# Patient Record
Sex: FEMALE | Race: BLACK OR AFRICAN AMERICAN | NOT HISPANIC OR LATINO
[De-identification: names, ages, dates, MRNs, and addresses within clinical notes are randomized per-mention and may not be internally consistent; named-entity substitution may affect disease eponyms.]

---

## 2017-07-21 PROBLEM — Z00.00 ENCOUNTER FOR PREVENTIVE HEALTH EXAMINATION: Status: ACTIVE | Noted: 2017-07-21

## 2017-07-26 ENCOUNTER — APPOINTMENT (OUTPATIENT)
Dept: OBGYN | Facility: CLINIC | Age: 27
End: 2017-07-26

## 2017-07-26 ENCOUNTER — OUTPATIENT (OUTPATIENT)
Dept: OUTPATIENT SERVICES | Facility: HOSPITAL | Age: 27
LOS: 1 days | Discharge: HOME | End: 2017-07-26

## 2017-07-31 DIAGNOSIS — Z02.9 ENCOUNTER FOR ADMINISTRATIVE EXAMINATIONS, UNSPECIFIED: ICD-10-CM

## 2017-10-31 ENCOUNTER — APPOINTMENT (OUTPATIENT)
Dept: SURGERY | Facility: CLINIC | Age: 27
End: 2017-10-31
Payer: COMMERCIAL

## 2017-10-31 VITALS
DIASTOLIC BLOOD PRESSURE: 70 MMHG | BODY MASS INDEX: 28.53 KG/M2 | HEIGHT: 63 IN | SYSTOLIC BLOOD PRESSURE: 122 MMHG | WEIGHT: 161 LBS

## 2017-10-31 DIAGNOSIS — K60.2 ANAL FISSURE, UNSPECIFIED: ICD-10-CM

## 2017-10-31 DIAGNOSIS — Z82.49 FAMILY HISTORY OF ISCHEMIC HEART DISEASE AND OTHER DISEASES OF THE CIRCULATORY SYSTEM: ICD-10-CM

## 2017-10-31 DIAGNOSIS — Z82.3 FAMILY HISTORY OF STROKE: ICD-10-CM

## 2017-10-31 PROCEDURE — 99203 OFFICE O/P NEW LOW 30 MIN: CPT | Mod: 25

## 2017-10-31 PROCEDURE — 46600 DIAGNOSTIC ANOSCOPY SPX: CPT

## 2017-10-31 RX ORDER — HYDROCORTISONE 25 MG/G
2.5 CREAM TOPICAL 3 TIMES DAILY
Qty: 60 | Refills: 0 | Status: ACTIVE | COMMUNITY
Start: 2017-10-31 | End: 1900-01-01

## 2017-11-01 PROBLEM — Z82.3 FAMILY HISTORY OF CEREBROVASCULAR ACCIDENT (CVA): Status: ACTIVE | Noted: 2017-10-31

## 2017-11-01 PROBLEM — Z82.49 FAMILY HISTORY OF HYPERTENSION: Status: ACTIVE | Noted: 2017-10-31

## 2017-11-01 RX ORDER — PRENATAL VIT 75/IRON/FOLIC/OM3 28-800-223
28-0.8 & 223 COMBINATION PACKAGE (EA) ORAL
Refills: 0 | Status: ACTIVE | COMMUNITY

## 2017-11-01 RX ORDER — OMEGA-3/DHA/EPA/FISH OIL 35-113.5MG
1000 TABLET,CHEWABLE ORAL
Refills: 0 | Status: ACTIVE | COMMUNITY

## 2017-11-28 ENCOUNTER — APPOINTMENT (OUTPATIENT)
Dept: SURGERY | Facility: CLINIC | Age: 27
End: 2017-11-28

## 2017-12-09 ENCOUNTER — OUTPATIENT (OUTPATIENT)
Dept: OUTPATIENT SERVICES | Facility: HOSPITAL | Age: 27
LOS: 1 days | Discharge: HOME | End: 2017-12-09

## 2017-12-09 DIAGNOSIS — O26.892 OTHER SPECIFIED PREGNANCY RELATED CONDITIONS, SECOND TRIMESTER: ICD-10-CM
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
## 2018-02-04 ENCOUNTER — OUTPATIENT (OUTPATIENT)
Dept: OUTPATIENT SERVICES | Facility: HOSPITAL | Age: 28
LOS: 1 days | Discharge: HOME | End: 2018-02-04

## 2018-02-04 VITALS — SYSTOLIC BLOOD PRESSURE: 123 MMHG | TEMPERATURE: 98 F | DIASTOLIC BLOOD PRESSURE: 75 MMHG

## 2018-02-04 DIAGNOSIS — Z98.890 OTHER SPECIFIED POSTPROCEDURAL STATES: Chronic | ICD-10-CM

## 2018-02-04 LAB
APTT BLD: 23.5 SEC — CRITICAL LOW (ref 27–39.2)
FIBRINOGEN PPP-MCNC: 606 MG/DL — HIGH (ref 204.4–570.6)
HCT VFR BLD CALC: 30.5 % — LOW (ref 37–47)
HGB BLD-MCNC: 10.1 G/DL — LOW (ref 14–18)
INR BLD: 0.94 RATIO — SIGNIFICANT CHANGE UP (ref 0.65–1.3)
MCHC RBC-ENTMCNC: 30.2 PG — SIGNIFICANT CHANGE UP (ref 27–31)
MCHC RBC-ENTMCNC: 33.1 G/DL — SIGNIFICANT CHANGE UP (ref 32–37)
MCV RBC AUTO: 91.3 FL — HIGH (ref 81–91)
NRBC # BLD: 0 /100 WBCS — SIGNIFICANT CHANGE UP (ref 0–0)
PLATELET # BLD AUTO: 254 K/UL — SIGNIFICANT CHANGE UP (ref 130–400)
PROTHROM AB SERPL-ACNC: 10.1 SEC — SIGNIFICANT CHANGE UP (ref 9.95–12.87)
RBC # BLD: 3.34 M/UL — LOW (ref 4.2–5.4)
RBC # FLD: 12.6 % — SIGNIFICANT CHANGE UP (ref 11.5–14.5)
WBC # BLD: 13.42 K/UL — HIGH (ref 4.8–10.8)
WBC # FLD AUTO: 13.42 K/UL — HIGH (ref 4.8–10.8)

## 2018-02-04 RX ORDER — SODIUM CHLORIDE 9 MG/ML
1000 INJECTION, SOLUTION INTRAVENOUS
Qty: 0 | Refills: 0 | Status: DISCONTINUED | OUTPATIENT
Start: 2018-02-04 | End: 2018-02-19

## 2018-02-04 RX ORDER — ACETAMINOPHEN 500 MG
650 TABLET ORAL ONCE
Qty: 0 | Refills: 0 | Status: COMPLETED | OUTPATIENT
Start: 2018-02-04 | End: 2018-02-04

## 2018-02-04 RX ADMIN — Medication 650 MILLIGRAM(S): at 19:49

## 2018-02-04 RX ADMIN — SODIUM CHLORIDE 125 MILLILITER(S): 9 INJECTION, SOLUTION INTRAVENOUS at 19:49

## 2018-02-04 RX ADMIN — Medication 650 MILLIGRAM(S): at 18:37

## 2018-02-04 NOTE — OB PROVIDER TRIAGE NOTE - NSOBPROVIDERNOTE_OBGYN_ALL_OB_FT
28 yo  at 35.5wk, s/p fall at 1245 r/o placental abruption and  labor  - Transfer to RR  - IV fluids  - Abruption labs  - Clear liquids  - Tylenol for pain    Dr. Carmona and Dr. Díaz aware

## 2018-02-04 NOTE — OB PROVIDER TRIAGE NOTE - NSHPPHYSICALEXAM_GEN_ALL_CORE
T(C): 36.7 (02-04-18 @ 16:51), Max: 36.7 (02-04-18 @ 16:51)  HR: 100 (02-04-18 @ 17:05) (100 - 100)  BP: 123/75 (02-04-18 @ 17:05) (123/75 - 123/75)  RR: 20 (02-04-18 @ 17:05) (20 - 20)  SpO2: --  Udip: neg  EFM: 140/mod/+accels  [place]: Q2-3m  SVE: C/L/P  ABD: Gravid, soft, NT, no palpable ctx, +lower back tenderness, no CVA or suprapubic tenderness

## 2018-02-04 NOTE — OB PROVIDER TRIAGE NOTE - HISTORY OF PRESENT ILLNESS
28 yo  at 35w5d by first trim sonogram, here for back pain after someone fell on her at work at 1245. 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 Him and her did not fall to the ground. His head grazed her right cheek. She denies any abdominal trauma. Afterwards, her back started to hurt, has gotten worse, constant worse with movement. Did not take any pain meds at work. Finished her shift and came to the hospital. Is able to walk but it hurts her back. Denies ctx, LOF, VB. Good FM since the incident. Denies fever, chills, CP, SOB, Abd pain, dysuria. Reports not eating or drinking much today. Denies complications during this pregnancy.

## 2018-02-05 VITALS — SYSTOLIC BLOOD PRESSURE: 105 MMHG | DIASTOLIC BLOOD PRESSURE: 63 MMHG | HEART RATE: 93 BPM

## 2018-02-05 LAB
APTT BLD: 28.5 SEC — SIGNIFICANT CHANGE UP (ref 27–39.2)
BLD GP AB SCN SERPL QL: SIGNIFICANT CHANGE UP
HCT VFR BLD CALC: 28.2 % — LOW (ref 37–47)
HGB BLD-MCNC: 9.4 G/DL — LOW (ref 14–18)
INR BLD: 0.92 RATIO — SIGNIFICANT CHANGE UP (ref 0.65–1.3)
MCHC RBC-ENTMCNC: 30.4 PG — SIGNIFICANT CHANGE UP (ref 27–31)
MCHC RBC-ENTMCNC: 33.3 G/DL — SIGNIFICANT CHANGE UP (ref 32–37)
MCV RBC AUTO: 91.3 FL — HIGH (ref 81–91)
NRBC # BLD: 0 /100 WBCS — SIGNIFICANT CHANGE UP (ref 0–0)
PLATELET # BLD AUTO: 227 K/UL — SIGNIFICANT CHANGE UP (ref 130–400)
PROTHROM AB SERPL-ACNC: 9.9 SEC — LOW (ref 9.95–12.87)
RBC # BLD: 3.09 M/UL — LOW (ref 4.2–5.4)
RBC # FLD: 12.7 % — SIGNIFICANT CHANGE UP (ref 11.5–14.5)
TYPE + AB SCN PNL BLD: SIGNIFICANT CHANGE UP
WBC # BLD: 14.09 K/UL — HIGH (ref 4.8–10.8)
WBC # FLD AUTO: 14.09 K/UL — HIGH (ref 4.8–10.8)

## 2018-02-05 NOTE — PROGRESS NOTE ADULT - ASSESSMENT
A/P:   27y  at 35wk6d GA s/p abdominal trauma while at work for prolonged observation, r/o abruption and r/o PTL  - continuous toco/efm  -pain management prn  -re-examine at 1200 and likely d/c home  -f/u with PMD as scheduled    Will inform Dr. Díaz.

## 2018-02-05 NOTE — PROGRESS NOTE ADULT - SUBJECTIVE AND OBJECTIVE BOX
PGY 3 note    Pt seen and examined at bedside in NAD. Denies any complaints at this time. Reports good fetal movement. Denies any CTX, LOF or VB.     T(F): 98.24 (04:43)  HR: 97 (04:43)  BP: 99/59 (04:43)  RR: 18 (05:58)  EFM: 145, mod.leandro, + accels  TOCO: occasional  SVE: deferred, last exam c/l/p    Medications:  acetaminophen   Tablet.: 650 milliGRAM(s) (02-04-18 @ 18:37)  dextrose 5% + sodium chloride 0.45%.: 125 mL/Hr (02-04-18 @ 18:28)      Labs:                        9.4    14.09 )-----------( 227      ( 05 Feb 2018 01:25 )             28.2

## 2018-02-05 NOTE — DISCHARGE NOTE OB TRIAGE - ADDITIONAL INSTRUCTIONS
given to the pt by dr Carmona given to the pt by dr Carmona  26 yo  at 35w6d, with  contractions, now resolved, patient doing well  - follow up with PMD as scheduled  -  labor precautions given  - kick count instructions discussed  - PO hydration encouraged

## 2018-02-06 ENCOUNTER — OUTPATIENT (OUTPATIENT)
Dept: OUTPATIENT SERVICES | Facility: HOSPITAL | Age: 28
LOS: 1 days | Discharge: HOME | End: 2018-02-06

## 2018-02-06 DIAGNOSIS — Z98.890 OTHER SPECIFIED POSTPROCEDURAL STATES: Chronic | ICD-10-CM

## 2018-02-07 DIAGNOSIS — N89.8 OTHER SPECIFIED NONINFLAMMATORY DISORDERS OF VAGINA: ICD-10-CM

## 2018-03-01 ENCOUNTER — INPATIENT (INPATIENT)
Facility: HOSPITAL | Age: 28
LOS: 4 days | Discharge: HOME | End: 2018-03-06
Attending: OBSTETRICS & GYNECOLOGY | Admitting: OBSTETRICS & GYNECOLOGY

## 2018-03-01 ENCOUNTER — OUTPATIENT (OUTPATIENT)
Dept: OUTPATIENT SERVICES | Facility: HOSPITAL | Age: 28
LOS: 1 days | Discharge: HOME | End: 2018-03-01

## 2018-03-01 VITALS — SYSTOLIC BLOOD PRESSURE: 129 MMHG | HEART RATE: 82 BPM | DIASTOLIC BLOOD PRESSURE: 82 MMHG

## 2018-03-01 VITALS — HEART RATE: 88 BPM | SYSTOLIC BLOOD PRESSURE: 129 MMHG | DIASTOLIC BLOOD PRESSURE: 91 MMHG

## 2018-03-01 VITALS — TEMPERATURE: 98 F

## 2018-03-01 DIAGNOSIS — Z98.890 OTHER SPECIFIED POSTPROCEDURAL STATES: Chronic | ICD-10-CM

## 2018-03-01 RX ORDER — SODIUM CHLORIDE 9 MG/ML
1000 INJECTION, SOLUTION INTRAVENOUS ONCE
Qty: 0 | Refills: 0 | Status: DISCONTINUED | OUTPATIENT
Start: 2018-03-01 | End: 2018-03-06

## 2018-03-01 RX ORDER — BUTORPHANOL TARTRATE 2 MG/ML
2 INJECTION, SOLUTION INTRAMUSCULAR; INTRAVENOUS ONCE
Qty: 0 | Refills: 0 | Status: DISCONTINUED | OUTPATIENT
Start: 2018-03-01 | End: 2018-03-01

## 2018-03-01 RX ADMIN — BUTORPHANOL TARTRATE 2 MILLIGRAM(S): 2 INJECTION, SOLUTION INTRAMUSCULAR; INTRAVENOUS at 18:32

## 2018-03-01 RX ADMIN — BUTORPHANOL TARTRATE 2 MILLIGRAM(S): 2 INJECTION, SOLUTION INTRAMUSCULAR; INTRAVENOUS at 18:56

## 2018-03-01 RX ADMIN — Medication 25 MILLIGRAM(S): at 18:32

## 2018-03-01 NOTE — OB PROVIDER TRIAGE NOTE - NSHPPHYSICALEXAM_GEN_ALL_CORE
Vital Signs Last 24 Hrs  T(F): 98.3 (01 Mar 2018 04:16), Max: 98.3 (01 Mar 2018 04:16)  HR: 82 (01 Mar 2018 05:05) (82 - 88)  BP: 129/82 (01 Mar 2018 05:05) (129/82 - 129/91)  RR: 18 (01 Mar 2018 04:16) (18 - 18)    ABd: palpable ctx  udip: large leuks, small blood  EFM: 140/mod/+accels  SVE: 1/50/-2. vtx

## 2018-03-01 NOTE — OB PROVIDER TRIAGE NOTE - NSHPLABSRESULTS_GEN_ALL_CORE
GBS negative 2/6/18  HIV NR 1/29/18  Sequential screen negative  HBSAG negative 8/1/17  O positive 8/1/17  RPR NR 8/1/17  Rubella immune 8/1/17

## 2018-03-01 NOTE — OB PROVIDER TRIAGE NOTE - HISTORY OF PRESENT ILLNESS
26 yo  at 39w2d GA here for ctx since midnight, now 10/10 intensity, q3m. She was previously seen in triage at 0500 today with contractions, 1cm dilated at that time. Currently denies LOF. Feels good FM. Reports vaginal spotting since being examined today but denies deepti VB. Denies issues this pregnancy.

## 2018-03-01 NOTE — OB PROVIDER TRIAGE NOTE - NSHPPHYSICALEXAM_GEN_ALL_CORE
Vital Signs Last 24 Hrs  T(F): 98.2 (01 Mar 2018 17:12), Max: 98.3 (01 Mar 2018 04:16)  HR: 85 (01 Mar 2018 17:16) (82 - 88)  BP: 120/79 (01 Mar 2018 17:16) (120/79 - 129/91)  RR: 18 (01 Mar 2018 17:16) (18 - 18)

## 2018-03-01 NOTE — OB PROVIDER TRIAGE NOTE - NSOBPROVIDERNOTE_OBGYN_ALL_OB_FT
26 yo  at 39w2d, GBS neg, likely in early labor.     -Elevated BP in triage, likely from pain with contraction. Repeated wnl. PMD aware, to discharge from triage per PMD.  -Discharge  -Labor precautions, preeclamptic precautions, C discussed  -Encourage PO hydration  -Follow up with PMD for appointment 3/6.    Dr. Adan and Dr. Díaz aware.

## 2018-03-01 NOTE — OB PROVIDER TRIAGE NOTE - HISTORY OF PRESENT ILLNESS
26 yo  at 39w2d GA with FEMI 3/6/18 by LMP c/w first trimester sono presents for ctx which started at 0130 am, now every 3 min, pain 10/10 at worse. Reports good FM, denies LOF, VB. No complications during this pregnancy. Last examined Tuesday, found to 1 cm. GBS neg. Denies headache, blurry vision, CP/SOB, RUQ/epigastric pain, or increased swelling.    OBhx: SABx1, etopx1 with D&C  Gynhx: denies

## 2018-03-01 NOTE — OB PROVIDER TRIAGE NOTE - NSOBPROVIDERNOTE_OBGYN_ALL_OB_FT
28 yo  at 39w2d GA, GBS negative, not in labor, for therapeutic rest  -transfer to recovery room  -IV fluids  -stadol & phenergan  -bedrest

## 2018-03-02 DIAGNOSIS — Z98.890 OTHER SPECIFIED POSTPROCEDURAL STATES: Chronic | ICD-10-CM

## 2018-03-02 LAB
APPEARANCE UR: (no result)
BACTERIA # UR AUTO: SIGNIFICANT CHANGE UP
BILIRUB UR-MCNC: NEGATIVE — SIGNIFICANT CHANGE UP
BLD GP AB SCN SERPL QL: SIGNIFICANT CHANGE UP
COLOR SPEC: YELLOW — SIGNIFICANT CHANGE UP
COMMENT - URINE: SIGNIFICANT CHANGE UP
DIFF PNL FLD: NEGATIVE — SIGNIFICANT CHANGE UP
GLUCOSE UR QL: NEGATIVE MG/DL — SIGNIFICANT CHANGE UP
HCT VFR BLD CALC: 32.7 % — LOW (ref 37–47)
HGB BLD-MCNC: 10.4 G/DL — LOW (ref 12–16)
KETONES UR-MCNC: (no result)
LEUKOCYTE ESTERASE UR-ACNC: (no result)
MCHC RBC-ENTMCNC: 30.4 PG — SIGNIFICANT CHANGE UP (ref 27–31)
MCHC RBC-ENTMCNC: 31.8 G/DL — LOW (ref 32–37)
MCV RBC AUTO: 95.6 FL — SIGNIFICANT CHANGE UP (ref 81–99)
NITRITE UR-MCNC: NEGATIVE — SIGNIFICANT CHANGE UP
NRBC # BLD: 0 /100 WBCS — SIGNIFICANT CHANGE UP (ref 0–0)
PH UR: 6.5 — SIGNIFICANT CHANGE UP (ref 5–8)
PLATELET # BLD AUTO: 246 K/UL — SIGNIFICANT CHANGE UP (ref 130–400)
PRENATAL SYPHILIS TEST: SIGNIFICANT CHANGE UP
PROT UR-MCNC: NEGATIVE MG/DL — SIGNIFICANT CHANGE UP
RBC # BLD: 3.42 M/UL — LOW (ref 4.2–5.4)
RBC # FLD: 13.5 % — SIGNIFICANT CHANGE UP (ref 11.5–14.5)
RBC CASTS # UR COMP ASSIST: SIGNIFICANT CHANGE UP /HPF
SP GR SPEC: 1.01 — SIGNIFICANT CHANGE UP (ref 1.01–1.03)
TYPE + AB SCN PNL BLD: SIGNIFICANT CHANGE UP
UROBILINOGEN FLD QL: 0.2 MG/DL — SIGNIFICANT CHANGE UP (ref 0.2–0.2)
WBC # BLD: 16.01 K/UL — HIGH (ref 4.8–10.8)
WBC # FLD AUTO: 16.01 K/UL — HIGH (ref 4.8–10.8)
WBC UR QL: SIGNIFICANT CHANGE UP /HPF

## 2018-03-02 RX ORDER — SODIUM CHLORIDE 9 MG/ML
1000 INJECTION, SOLUTION INTRAVENOUS
Qty: 0 | Refills: 0 | Status: DISCONTINUED | OUTPATIENT
Start: 2018-03-02 | End: 2018-03-03

## 2018-03-02 RX ORDER — ONDANSETRON 8 MG/1
4 TABLET, FILM COATED ORAL EVERY 6 HOURS
Qty: 0 | Refills: 0 | Status: DISCONTINUED | OUTPATIENT
Start: 2018-03-02 | End: 2018-03-06

## 2018-03-02 RX ORDER — GENTAMICIN SULFATE 40 MG/ML
VIAL (ML) INJECTION
Qty: 0 | Refills: 0 | Status: DISCONTINUED | OUTPATIENT
Start: 2018-03-02 | End: 2018-03-04

## 2018-03-02 RX ORDER — GENTAMICIN SULFATE 40 MG/ML
80 VIAL (ML) INJECTION EVERY 8 HOURS
Qty: 0 | Refills: 0 | Status: DISCONTINUED | OUTPATIENT
Start: 2018-03-03 | End: 2018-03-04

## 2018-03-02 RX ORDER — ACETAMINOPHEN 500 MG
650 TABLET ORAL EVERY 6 HOURS
Qty: 0 | Refills: 0 | Status: DISCONTINUED | OUTPATIENT
Start: 2018-03-02 | End: 2018-03-06

## 2018-03-02 RX ORDER — OXYTOCIN 10 UNIT/ML
125 VIAL (ML) INJECTION
Qty: 20 | Refills: 0 | Status: DISCONTINUED | OUTPATIENT
Start: 2018-03-02 | End: 2018-03-03

## 2018-03-02 RX ORDER — GENTAMICIN SULFATE 40 MG/ML
80 VIAL (ML) INJECTION ONCE
Qty: 0 | Refills: 0 | Status: COMPLETED | OUTPATIENT
Start: 2018-03-02 | End: 2018-03-02

## 2018-03-02 RX ORDER — OXYTOCIN 10 UNIT/ML
2 VIAL (ML) INJECTION
Qty: 30 | Refills: 0 | Status: DISCONTINUED | OUTPATIENT
Start: 2018-03-02 | End: 2018-03-06

## 2018-03-02 RX ORDER — NALOXONE HYDROCHLORIDE 4 MG/.1ML
0.1 SPRAY NASAL
Qty: 0 | Refills: 0 | Status: DISCONTINUED | OUTPATIENT
Start: 2018-03-02 | End: 2018-03-06

## 2018-03-02 RX ORDER — AMPICILLIN TRIHYDRATE 250 MG
2 CAPSULE ORAL EVERY 6 HOURS
Qty: 0 | Refills: 0 | Status: DISCONTINUED | OUTPATIENT
Start: 2018-03-02 | End: 2018-03-04

## 2018-03-02 RX ORDER — SODIUM CHLORIDE 9 MG/ML
500 INJECTION, SOLUTION INTRAVENOUS ONCE
Qty: 0 | Refills: 0 | Status: DISCONTINUED | OUTPATIENT
Start: 2018-03-02 | End: 2018-03-03

## 2018-03-02 RX ORDER — GENTAMICIN SULFATE 40 MG/ML
80 VIAL (ML) INJECTION EVERY 8 HOURS
Qty: 0 | Refills: 0 | Status: DISCONTINUED | OUTPATIENT
Start: 2018-03-02 | End: 2018-03-02

## 2018-03-02 RX ORDER — ACETAMINOPHEN 500 MG
650 TABLET ORAL ONCE
Qty: 0 | Refills: 0 | Status: COMPLETED | OUTPATIENT
Start: 2018-03-02 | End: 2018-03-02

## 2018-03-02 RX ADMIN — Medication 650 MILLIGRAM(S): at 19:23

## 2018-03-02 RX ADMIN — Medication 216 GRAM(S): at 19:26

## 2018-03-02 RX ADMIN — Medication 200 MILLIGRAM(S): at 20:18

## 2018-03-02 RX ADMIN — Medication 2 MILLIUNIT(S)/MIN: at 11:12

## 2018-03-02 RX ADMIN — Medication 650 MILLIGRAM(S): at 19:25

## 2018-03-02 NOTE — OB PROVIDER H&P - NSHPLABSRESULTS_GEN_ALL_CORE
GBS negative 2/6/18  HIV NR 1/29/18  Sequential screen negative  HBSAG negative 8/1/17  O positive 8/1/17  RPR NR 8/1/17  Rubella immune 8/1/17 GBS negative 2/6/18  HIV NR 1/29/18  Sequential screen negative, gen path no aneuploidy  HBSAG negative 8/1/17  O positive 8/1/17  RPR NR 8/1/17  Rubella immune 8/1/17  GCT 95    Sonograms:   29w0d - cephalic, 3VC, anterior palcenta, MVP 62mm, cervix 34mm, EFW 1422 grams (59%ile), BPP 8/8  19w0d - transverse presentation, 3VC, anterior placenta, normal AF,  grams, normal anatomy  12w0d - viable IUP

## 2018-03-02 NOTE — PROGRESS NOTE ADULT - SUBJECTIVE AND OBJECTIVE BOX
Progress note  PGY4     Pt seen and examined at bedside, comfortable    Vitals:  T(C): 36.8 (18 @ 09:13), Max: 36.8 (18 @ 17:12)  HR: 94 (18 @ 13:12)  BP: 110/62 (18 @ 13:12)  RR: 18 (18 @ 09:13)    EFM: 150 mod leandro,+accel  [place]: q3mins  SVE:deferred    Medications:  acetaminophen   Tablet. 650 milliGRAM(s) Oral every 6 hours PRN  lactated ringers Bolus 1000 milliLiter(s) IV Bolus once  lactated ringers Bolus 500 milliLiter(s) IV Bolus once  lactated ringers. 1000 milliLiter(s) (125 mL/Hr) IV Continuous <Continuous>  naloxone Injectable 0.1 milliGRAM(s) IV Push every 3 minutes PRN  ondansetron Injectable 4 milliGRAM(s) IV Push every 6 hours PRN  oxytocin Infusion 2 milliUNIT(s)/Min (2 mL/Hr) IV Continuous <Continuous>  oxytocin Infusion 125 milliUNIT(s)/Min (375 mL/Hr) IV Continuous <Continuous>      Laboratory results:                        10.4   16.01 )-----------( 246      ( 02 Mar 2018 07:20 )             32.7     A/P HPI:  28 yo  at 39w2d GBS negative in latent labor augmented with pitocin  -continue monitoring  -reevaluate    Dr Díaz aware

## 2018-03-02 NOTE — OB PROVIDER IHI INDUCTION/AUGMENTATION NOTE - NS_OBIHIREPANPSATTEST_OBGYN_ALL_OB
I have discussed with the Attending the patient's status, as well as the H&P and the fetal status. The Attending agreed with the suggested management.

## 2018-03-02 NOTE — PROGRESS NOTE ADULT - SUBJECTIVE AND OBJECTIVE BOX
PGY 3 Addendum    Patient seen and examined at bedside, pitocin d/c for late variables. Fetal tachycardia present. Patient re-examined, now 4/90/-1, AROM, non foul smelling discharge. Patient on left lateral position. Bolus given.   EFM: 180/mod/no acc, with late decels after contractions  toco: q5 minutes  VE: 4/90/-1, AROM    - Will restart pitocin with cat 1 tracing  - pain management PRN  - continuous toco and efm    Dr. Díaz aware

## 2018-03-02 NOTE — PROCEDURE NOTE - NSSITEPREP_SKIN_A_CORE
Adherence to aseptic technique: hand hygiene prior to donning barriers (gown, gloves), don cap and mask, sterile drape over patient/Povidone iodine x3

## 2018-03-02 NOTE — OB PROVIDER H&P - ATTENDING COMMENTS
IMP:  IUP @ 39.2 wks, prolonged latent phase of labor    Plan:  Admit, Pain Management, Pitocin, Anticipated

## 2018-03-02 NOTE — PROGRESS NOTE ADULT - SUBJECTIVE AND OBJECTIVE BOX
PGY1 Note    Patient seen and examined at bedside, continues to report painful contractions, refusing exam at this time.     T(F): 98.2 ( @ 17:12), Max: 98.3 ( @ 04:16)  HR: 82 ( @ 03:01)  BP: 119/- (- @ 03:01) (119/- - 129/91)  RR: 18 ( @ 17:16)  EFM: 140/mod/+accels  TOCO: q7min  SVE: deferred, last examined at 1800 /-3    Medications:  butorphanol Injectable: 2 ( @ 18:32)  promethazine Injectable: 25 ( @ 18:32)    Labs:  none new    A/P:  28 yo  at 39w3d, s/p stadol at 1800, for therapeutic rest.  -Pt refused exam at this time, despite continued painful contractions q10min. Pt wishes to be re-examined in the AM.  -f/u BPs  -pain management prn  -cont efm/toco  -cont to monitor vitals  -cont iv hydration    Dr. Adan aware, Dr. Díaz to be made aware.

## 2018-03-02 NOTE — OB PROVIDER H&P - NS_OBGYNHISTORY_OBGYN_ALL_OB_FT
OB Hx: ETOPX1 with D&C x1, SABX2 with no D&C  Gyn Hx: Denies cysts, fibroids, abnormal PAPs and STDs.

## 2018-03-02 NOTE — PROCEDURE NOTE - ADDITIONAL PROCEDURE DETAILS
REDOSE  Pain:5.5/10  Time:1536  Medication:Lidocaine 2% 6ml  Given in increments after aspiration REDOSE  Pain:5.5/10  Time:1536  Medication:Lidocaine 2% 6ml    REDOSE  Pain:7/10  Time:1620  Medication: Bupivicaine 0.25@ 8ml  Given in increments after aspiration  Given in increments after aspiration REDOSE  Pain:5.5/10  Time:1536  Medication:Lidocaine 2% 6ml      REDOSE  Pain:7/10  Time:17:20  Medication: fentanyl 100mcg  Given in increments after aspiration  REDOSE  Pain:7/10  Time:1620  Medication: Bupivicaine 0.25@ 8ml  Given in increments after aspiration  Given in increments after aspiration REDOSE  Pain:5.5/10  Time:1536  Medication:Lidocaine 2% 6ml  20.00pm Top off given to pt. 100mcg fentanyl with 4cc .25% bupivacaine.      REDOSE  Pain:7/10  Time:17:20  Medication: fentanyl 100mcg  Given in increments after aspiration  REDOSE  Pain:7/10  Time:1620  Medication: Bupivicaine 0.25@ 8ml  Given in increments after aspiration  Given in increments after aspiration REDOSE  Pain:5.5/10  Time:1536  Medication:Lidocaine 2% 6ml  20.00pm Top off given to pt. 100mcg fentanyl with 4cc .25% bupivacaine.  22.45pm New epidural bag made with 200mcg fentanyl added      REDOSE  Pain:7/10  Time:17:20  Medication: fentanyl 100mcg  Given in increments after aspiration  REDOSE  Pain:7/10  Time:1620  Medication: Bupivicaine 0.25@ 8ml  Given in increments after aspiration  Given in increments after aspiration

## 2018-03-02 NOTE — PROGRESS NOTE ADULT - SUBJECTIVE AND OBJECTIVE BOX
PGY1 Note    Patient seen and evaluated at bedside     T(F): 98.2 (03-01 @ 17:12), Max: 98.2 (03-01 @ 17:12)  HR: 89 (03-02 @ 05:46)  BP: 108/77 (03-02 @ 05:46) (98/61 - 130/73)  RR: 18 (03-01 @ 17:16)  EFM:  TOCO:  SVE:    Medications:  butorphanol Injectable: 2 (03-01 @ 18:32)  promethazine Injectable: 25 (03-01 @ 18:32)      Labs:                A/P:   27y in laborPatient is a 27y old  Female who presents with a chief complaint of   -continue pitocin  -pain management prn  -cont efm/toco  -f/u pending labs  -cont to monitor vitals  -cont iv hydration PGY1 Note    Patient seen and evaluated at bedside for 3min decel down to 70bpm, re-examined and found to be unchanged at /-2. Pt repositioned, bolus administered, tracing recovered and now Cat1.    T(F): 98.2 ( @ 17:12), Max: 98.2 ( @ 17:12)  HR: 89 ( @ 05:46)  BP: 108/77 ( @ 05:46) (98/61 - 130/73)  RR: 18 ( @ 17:16)  EFM: 140/mod/+accels  TOCO: q7-10min  SVE: /-3    Medications:  butorphanol Injectable: 2 ( @ 18:32)  promethazine Injectable: 25 ( @ 18:32)    Labs:  None      A/P:     28 yo  at 39w3d, s/p therapeutic rest and stadol at 1800, s/p decel, with tracing now Cat1.  -pain management prn  -cont efm/toco  -cont to monitor vitals  -cont iv hydration    Dr. Adan and Dr. Díaz aware.

## 2018-03-02 NOTE — PROGRESS NOTE ADULT - SUBJECTIVE AND OBJECTIVE BOX
PGY 3 Addendum    Patient evaluated at bedside for 3 minute decel from 150 BPM to 60 BPM, patient on left lateral and O2 mask; patient resuscitated and pitocin discontinued (was restarted at 2207, 2 mu/min). Patient repositioned and bolused fluid. Tracing spontaneously recovered to baseline 150 BPM. Patient re-examined and 4/90/-1.    will inform Dr. Díaz

## 2018-03-02 NOTE — OB PROVIDER H&P - HISTORY OF PRESENT ILLNESS
26 yo  at 39w2d dated by LMP c/w first trimester sonogram, s/p therapeutic rest. Reports contractions are now q5 min. 10/10 in intensity. Has some vaginal spotting but no deepti vaginal bleeding. Denies LOF. Reports good fetal movements. No complications during this pregnancy. GBS negative.

## 2018-03-02 NOTE — PROGRESS NOTE ADULT - SUBJECTIVE AND OBJECTIVE BOX
PGY1 note    Pt seen and examined at bedside for late decelerations. Patient on right lateral side with oxygen and IV fluids running. Pitocin discontinued. Due to fever of 101.5 along with maternal and fetal tachycardia patient diagnosed with chorioamnionitis and started on amp/gent.     VS:  Vital Signs Last 24 Hrs  T(C): 38.6 (02 Mar 2018 19:09), Max: 38.6 (02 Mar 2018 19:09)  T(F): 101.48 (02 Mar 2018 19:09), Max: 101.48 (02 Mar 2018 19:09)  HR: 100 (02 Mar 2018 20:10) (78 - 126)  BP: 104/51 (02 Mar 2018 20:10) (95/57 - 138/90)  RR: 18 (02 Mar 2018 09:13) (18 - 19)    EFM: 180/mod/no accels/late decels down to 150 bpm lasting 90 seconds with return to baseline.   toco: q2-3 min.   SVE: 4/90/-2, vertex, ruptured     A/P:  28 y/o  at 39w2d GA, GBS negative, chorioamnionitis on amp/gent, in early labor, on pitocin for labor augmentation.  - continuous EFM and toco  - pain management PRN  - will restart pitocin once tracing category I    Dr. Carmona and Dr. Díaz aware.

## 2018-03-02 NOTE — OB PROVIDER H&P - ASSESSMENT
28 y/o  at 39w2d GA, GBS negative, in early labor.  - admit to L&D  - clear liquids  - pain management PRN  - continuous EFM and toco    Dr. Stark and Dr. Díaz aware.

## 2018-03-02 NOTE — OB PROVIDER IHI INDUCTION/AUGMENTATION NOTE - NS_CHECKALL_OBGYN_ALL_OB
H&P was completed/FHR was reviewed/Order was written/Contractions pattern was reviewed/Induction / Augmentation was discussed

## 2018-03-02 NOTE — PROGRESS NOTE ADULT - SUBJECTIVE AND OBJECTIVE BOX
PGY1 note    Pt seen at bedside, resting comfortably. Reports mild pain during contractions.    T(C): 37.3 (18 @ 17:34), Max: 37.3 (18 @ 17:34)  HR: 96 (18 @ 18:24)  BP: 118/67 (18 @ 18:24)  RR: 18 (18 @ 09:13)    EFM: 150/mod/accels+  [place]: q2-3 min.  SVE: deferred, last exam at 1555 was 3/80/-2, cephalic, intact (Dr. Díaz)    meds:  s/p epidural  1832 stadol  1100 pitocin started, now at 14 mU/min      labs:  O positive, RPR NR                        10.4   16.01 )-----------( 246      ( 02 Mar 2018 07:20 )             32.7       A/P:  28 y/o  at 39w2d GA, GBS negative, in early labor, on pitocin for labor augementation.  - continuous EFM and toco  - pain management PRN  - c/w pitocin  - will collect UDS and UA    Dr. Carmona and Dr. Díaz aware.

## 2018-03-02 NOTE — OB PROVIDER H&P - NSHPPHYSICALEXAM_GEN_ALL_CORE
Vital Signs Last 24 Hrs  T(C): 36.8 (02 Mar 2018 06:30), Max: 36.8 (01 Mar 2018 17:12)  T(F): 98.24 (02 Mar 2018 06:30), Max: 98.24 (02 Mar 2018 06:30)  HR: 94 (02 Mar 2018 08:17) (80 - 103)  BP: 107/59 (02 Mar 2018 08:17) (98/61 - 130/73)  BP(mean): --  RR: 19 (02 Mar 2018 06:30) (18 - 19)    abd: soft, gravid, nontender, palpable ctx  EFM: 130/mod/accels+  toco: q5 min.  SVE: 2/50/-3 per Dr. Díaz

## 2018-03-02 NOTE — OB PROVIDER IHI INDUCTION/AUGMENTATION NOTE - NS_OBIHIATTENDATTEST_OBGYN_ALL_OB
I have reviewed the history and the physical examination of the patient, as well as the assessment and plan, as was entered by the resident physician or the mid-level provider. I agree with the plan to induce or augment labor, and in my opinion, at this time, the use of Pitocin, and/or other induction agent(s), is safe and beneficial to mother and fetus.

## 2018-03-03 ENCOUNTER — RESULT REVIEW (OUTPATIENT)
Age: 28
End: 2018-03-03

## 2018-03-03 RX ORDER — SODIUM CHLORIDE 9 MG/ML
1000 INJECTION, SOLUTION INTRAVENOUS
Qty: 0 | Refills: 0 | Status: DISCONTINUED | OUTPATIENT
Start: 2018-03-03 | End: 2018-03-06

## 2018-03-03 RX ORDER — OXYTOCIN 10 UNIT/ML
41.67 VIAL (ML) INJECTION
Qty: 20 | Refills: 0 | Status: DISCONTINUED | OUTPATIENT
Start: 2018-03-03 | End: 2018-03-06

## 2018-03-03 RX ORDER — ENOXAPARIN SODIUM 100 MG/ML
40 INJECTION SUBCUTANEOUS AT BEDTIME
Qty: 0 | Refills: 0 | Status: DISCONTINUED | OUTPATIENT
Start: 2018-03-03 | End: 2018-03-06

## 2018-03-03 RX ORDER — IBUPROFEN 200 MG
600 TABLET ORAL EVERY 6 HOURS
Qty: 0 | Refills: 0 | Status: DISCONTINUED | OUTPATIENT
Start: 2018-03-03 | End: 2018-03-06

## 2018-03-03 RX ORDER — AER TRAVELER 0.5 G/1
1 SOLUTION RECTAL; TOPICAL EVERY 4 HOURS
Qty: 0 | Refills: 0 | Status: DISCONTINUED | OUTPATIENT
Start: 2018-03-03 | End: 2018-03-06

## 2018-03-03 RX ORDER — ACETAMINOPHEN 500 MG
650 TABLET ORAL EVERY 6 HOURS
Qty: 0 | Refills: 0 | Status: DISCONTINUED | OUTPATIENT
Start: 2018-03-03 | End: 2018-03-06

## 2018-03-03 RX ORDER — KETOROLAC TROMETHAMINE 30 MG/ML
30 SYRINGE (ML) INJECTION ONCE
Qty: 0 | Refills: 0 | Status: DISCONTINUED | OUTPATIENT
Start: 2018-03-03 | End: 2018-03-03

## 2018-03-03 RX ORDER — DOCUSATE SODIUM 100 MG
100 CAPSULE ORAL
Qty: 0 | Refills: 0 | Status: DISCONTINUED | OUTPATIENT
Start: 2018-03-03 | End: 2018-03-06

## 2018-03-03 RX ORDER — ONDANSETRON 8 MG/1
4 TABLET, FILM COATED ORAL ONCE
Qty: 0 | Refills: 0 | Status: DISCONTINUED | OUTPATIENT
Start: 2018-03-03 | End: 2018-03-06

## 2018-03-03 RX ORDER — OXYCODONE AND ACETAMINOPHEN 5; 325 MG/1; MG/1
2 TABLET ORAL EVERY 6 HOURS
Qty: 0 | Refills: 0 | Status: DISCONTINUED | OUTPATIENT
Start: 2018-03-03 | End: 2018-03-06

## 2018-03-03 RX ORDER — DIPHENHYDRAMINE HCL 50 MG
25 CAPSULE ORAL EVERY 6 HOURS
Qty: 0 | Refills: 0 | Status: DISCONTINUED | OUTPATIENT
Start: 2018-03-03 | End: 2018-03-06

## 2018-03-03 RX ORDER — MORPHINE SULFATE 50 MG/1
2 CAPSULE, EXTENDED RELEASE ORAL
Qty: 0 | Refills: 0 | Status: DISCONTINUED | OUTPATIENT
Start: 2018-03-03 | End: 2018-03-06

## 2018-03-03 RX ORDER — CEFAZOLIN SODIUM 1 G
VIAL (EA) INJECTION
Qty: 0 | Refills: 0 | Status: DISCONTINUED | OUTPATIENT
Start: 2018-03-03 | End: 2018-03-03

## 2018-03-03 RX ORDER — BENZOCAINE 10 %
1 GEL (GRAM) MUCOUS MEMBRANE EVERY 6 HOURS
Qty: 0 | Refills: 0 | Status: DISCONTINUED | OUTPATIENT
Start: 2018-03-03 | End: 2018-03-06

## 2018-03-03 RX ORDER — LANOLIN
1 OINTMENT (GRAM) TOPICAL
Qty: 0 | Refills: 0 | Status: DISCONTINUED | OUTPATIENT
Start: 2018-03-03 | End: 2018-03-06

## 2018-03-03 RX ORDER — HYDROCORTISONE 1 %
1 OINTMENT (GRAM) TOPICAL EVERY 4 HOURS
Qty: 0 | Refills: 0 | Status: DISCONTINUED | OUTPATIENT
Start: 2018-03-03 | End: 2018-03-06

## 2018-03-03 RX ORDER — DIBUCAINE 1 %
1 OINTMENT (GRAM) RECTAL EVERY 4 HOURS
Qty: 0 | Refills: 0 | Status: DISCONTINUED | OUTPATIENT
Start: 2018-03-03 | End: 2018-03-06

## 2018-03-03 RX ORDER — OXYCODONE AND ACETAMINOPHEN 5; 325 MG/1; MG/1
1 TABLET ORAL
Qty: 0 | Refills: 0 | Status: DISCONTINUED | OUTPATIENT
Start: 2018-03-03 | End: 2018-03-06

## 2018-03-03 RX ORDER — FERROUS SULFATE 325(65) MG
325 TABLET ORAL DAILY
Qty: 0 | Refills: 0 | Status: DISCONTINUED | OUTPATIENT
Start: 2018-03-03 | End: 2018-03-06

## 2018-03-03 RX ORDER — MORPHINE SULFATE 50 MG/1
0.2 CAPSULE, EXTENDED RELEASE ORAL ONCE
Qty: 0 | Refills: 0 | Status: DISCONTINUED | OUTPATIENT
Start: 2018-03-03 | End: 2018-03-06

## 2018-03-03 RX ORDER — SIMETHICONE 80 MG/1
80 TABLET, CHEWABLE ORAL EVERY 4 HOURS
Qty: 0 | Refills: 0 | Status: DISCONTINUED | OUTPATIENT
Start: 2018-03-03 | End: 2018-03-04

## 2018-03-03 RX ADMIN — Medication 325 MILLIGRAM(S): at 22:19

## 2018-03-03 RX ADMIN — Medication 30 MILLIGRAM(S): at 14:38

## 2018-03-03 RX ADMIN — Medication 216 GRAM(S): at 01:32

## 2018-03-03 RX ADMIN — Medication 200 MILLIGRAM(S): at 22:10

## 2018-03-03 RX ADMIN — Medication 216 GRAM(S): at 23:58

## 2018-03-03 RX ADMIN — SIMETHICONE 80 MILLIGRAM(S): 80 TABLET, CHEWABLE ORAL at 22:17

## 2018-03-03 RX ADMIN — Medication 216 GRAM(S): at 07:42

## 2018-03-03 RX ADMIN — Medication 1 TABLET(S): at 22:18

## 2018-03-03 RX ADMIN — Medication 600 MILLIGRAM(S): at 22:23

## 2018-03-03 RX ADMIN — Medication 650 MILLIGRAM(S): at 19:08

## 2018-03-03 RX ADMIN — ENOXAPARIN SODIUM 40 MILLIGRAM(S): 100 INJECTION SUBCUTANEOUS at 22:14

## 2018-03-03 RX ADMIN — Medication 200 MILLIGRAM(S): at 14:56

## 2018-03-03 RX ADMIN — Medication 200 MILLIGRAM(S): at 04:20

## 2018-03-03 RX ADMIN — Medication 200 MILLIGRAM(S): at 15:00

## 2018-03-03 RX ADMIN — Medication 100 MILLIGRAM(S): at 11:21

## 2018-03-03 RX ADMIN — Medication 216 GRAM(S): at 18:46

## 2018-03-03 RX ADMIN — Medication 100 MILLIGRAM(S): at 22:10

## 2018-03-03 NOTE — PROGRESS NOTE ADULT - SUBJECTIVE AND OBJECTIVE BOX
PGY 1 Note:    Pt evaluated at bedside by resident and PMD. SVE unchanged at 4cm. Primary  section offered for arrest of dilation. R/B/A/I discussed with pt, all questions answered. Pt opted to proceed with  section.    EFM: 155/mod/accels, some late decelerations to 140BPM  [place]: q4-6m                          10.4   16.01 )-----------( 246      ( 02 Mar 2018 07:20 )             32.7

## 2018-03-03 NOTE — PROGRESS NOTE ADULT - ASSESSMENT
A/P:  28yo Female s/p C/S POD1, UOP mildly inadequate, IV access lost due to infiltration, chorioamnionitis on triple abx  -Encourage PO hydration  -New IV access obtained, IV fluid bolus administered  -C/w amp/getn/clinda  -Pain management PRN  -F/u CBC

## 2018-03-03 NOTE — PROGRESS NOTE ADULT - SUBJECTIVE AND OBJECTIVE BOX
PGY1 note    Pt seen at bedside, resting comfortably. Reports pain has improved after top off.     T(C): 36.2 (18 @ 03:39), Max: 38.6 (18 @ 19:09)  HR: 82 (18 @ 04:10)  BP: 110/68 (18 @ 04:10)  RR: 18 (18 @ 03:39)    EFM: 150/mod/no accels/subtle late decelerations down to 140 bpm lasting 90 seconds with return to baseline 	  [place]: q2-7 min.  SVE: deferred, last exam at 2332 was 4/90/-1     meds:  s/p stadol and epidural  1112 pitocin started, discontinued at 0153 for category II tracing    A/P:  26 y/o  at 39w4d GA, GBS negative, chorioamnionitis on amp/gent, in early labor.  - continuous EFM and toco  - pain management PRN    Dr. Caromna aware. Dr. Díaz to be made aware.

## 2018-03-03 NOTE — PROGRESS NOTE ADULT - SUBJECTIVE AND OBJECTIVE BOX
PGY1 Note:    Pt doing well, pain well controlled. No acute complaints. Pt has not had IV fluids for the last 3 hours due to loss of IV access. Nursing staff having difficulty placing new line.    Ambulating: No  Voiding: Pr[place]  Flatus: No  Bowel movements: No   Breast or bottle feeding: Bottle  Diet: Clears    PAST MEDICAL & SURGICAL HISTORY:  No pertinent past medical history  S/P surgical removal of pilonidal cyst  H/O dilation and curettage      Physical Exam  Vital Signs Last 24 Hrs  T(F): 100.9 (03 Mar 2018 19:06), Max: 100.9 (03 Mar 2018 19:06)  HR: 80 (03 Mar 2018 16:30) (65 - 116)  BP: 123/73 (03 Mar 2018 16:30) (87/54 - 125/78)  RR: 18 (03 Mar 2018 16:30) (16 - 18)    UOP: concentrated, 37.5cc/hr last 4 hours, inadequate    Gen: AAOx3, NAD  Abd: Soft, appropriately tender, mildly distended, BS+  Fundus: Firm, appropriately tender, below the umbilicus  Lochia: Normal  Ext: No calf tenderness, no swelling    Labs:                        10.4   16.01 )-----------( 246      ( 02 Mar 2018 07:20 )             32.7 PGY1 Note:    Pt doing well, pain well controlled. No acute complaints. Pt has not had IV fluids for the last 3 hours due to loss of IV access. Nursing staff having difficulty placing new line.    Ambulating: No  Voiding: [place]  Flatus: No  Bowel movements: No   Breast or bottle feeding: Bottle  Diet: Clears    PAST MEDICAL & SURGICAL HISTORY:  No pertinent past medical history  S/P surgical removal of pilonidal cyst  H/O dilation and curettage      Physical Exam  Vital Signs Last 24 Hrs  T(F): 100.9 (03 Mar 2018 19:06), Max: 100.9 (03 Mar 2018 19:06)  HR: 80 (03 Mar 2018 16:30) (65 - 116)  BP: 123/73 (03 Mar 2018 16:30) (87/54 - 125/78)  RR: 18 (03 Mar 2018 16:30) (16 - 18)    UOP: concentrated, 37.5cc/hr last 4 hours, inadequate    Gen: AAOx3, NAD  Abd: Soft, appropriately tender, mildly distended, BS+, clean, dry, intact dressing in place  Fundus: Firm, appropriately tender, below the umbilicus  Lochia: Normal  Ext: No calf tenderness, no swelling    Labs:                        10.4   16.01 )-----------( 246      ( 02 Mar 2018 07:20 )             32.7

## 2018-03-03 NOTE — CHART NOTE - NSCHARTNOTEFT_GEN_A_CORE
PACU ANESTHESIA ADMISSION NOTE      Procedure:   Post op diagnosis:      ____  Intubated  TV:______       Rate: ______      FiO2: ______    x____  Patent Airway    __x__  Full return of protective reflexes    __x__  Full recovery from anesthesia / back to baseline     Vitals:   T: 98.6          R: 18                 /74:                  Sat:98                   P: 118      Mental Status:  _x___ Awake   x_____ Alert   _____ Drowsy   _____ Sedated    Nausea/Vomiting:  x____ NO  ______Yes, x  See Post - Op Orders          Pain Scale (0-10):  _0____    Treatment: ____ None    x____ See Post - Op/PCA Orders    Post - Operative Fluids:   ____ Oral   ____ See Post - Op Orders    Plan: Discharge:   ____Home   x    _____Floor     _____Critical Care    _____  Other:_________________    Comments:

## 2018-03-03 NOTE — PROGRESS NOTE ADULT - ASSESSMENT
26 yo  at 39w4d GA, GBS negative, chorioamnionitis, arrest of dilation, for primary  section  On call to OR  Skin prep  Pratt  Amp/gent/clinda for chorioamnionitis and skin prophylaxis    Dr. Díaz at bedside, Dr. Stark aware

## 2018-03-04 LAB
BASOPHILS # BLD AUTO: 0.03 K/UL — SIGNIFICANT CHANGE UP (ref 0–0.2)
BASOPHILS NFR BLD AUTO: 0.2 % — SIGNIFICANT CHANGE UP (ref 0–1)
EOSINOPHIL # BLD AUTO: 0.04 K/UL — SIGNIFICANT CHANGE UP (ref 0–0.7)
EOSINOPHIL NFR BLD AUTO: 0.2 % — SIGNIFICANT CHANGE UP (ref 0–8)
HCT VFR BLD CALC: 21.1 % — LOW (ref 37–47)
HGB BLD-MCNC: 7.1 G/DL — CRITICAL LOW (ref 12–16)
IMM GRANULOCYTES NFR BLD AUTO: 1.2 % — HIGH (ref 0.1–0.3)
LYMPHOCYTES # BLD AUTO: 11.3 % — LOW (ref 20.5–51.1)
LYMPHOCYTES # BLD AUTO: 2.2 K/UL — SIGNIFICANT CHANGE UP (ref 1.2–3.4)
MCHC RBC-ENTMCNC: 30.9 PG — SIGNIFICANT CHANGE UP (ref 27–31)
MCHC RBC-ENTMCNC: 33.6 G/DL — SIGNIFICANT CHANGE UP (ref 32–37)
MCV RBC AUTO: 91.7 FL — SIGNIFICANT CHANGE UP (ref 81–99)
MONOCYTES # BLD AUTO: 1.85 K/UL — HIGH (ref 0.1–0.6)
MONOCYTES NFR BLD AUTO: 9.5 % — HIGH (ref 1.7–9.3)
NEUTROPHILS # BLD AUTO: 15.05 K/UL — HIGH (ref 1.4–6.5)
NEUTROPHILS NFR BLD AUTO: 77.6 % — HIGH (ref 42.2–75.2)
NRBC # BLD: 0 /100 WBCS — SIGNIFICANT CHANGE UP (ref 0–0)
PLATELET # BLD AUTO: 178 K/UL — SIGNIFICANT CHANGE UP (ref 130–400)
RBC # BLD: 2.3 M/UL — LOW (ref 4.2–5.4)
RBC # FLD: 13.4 % — SIGNIFICANT CHANGE UP (ref 11.5–14.5)
WBC # BLD: 19.4 K/UL — HIGH (ref 4.8–10.8)
WBC # FLD AUTO: 19.4 K/UL — HIGH (ref 4.8–10.8)

## 2018-03-04 RX ORDER — SIMETHICONE 80 MG/1
80 TABLET, CHEWABLE ORAL
Qty: 0 | Refills: 0 | Status: CANCELLED | OUTPATIENT
Start: 2019-01-31 | End: 2018-03-06

## 2018-03-04 RX ORDER — SODIUM CHLORIDE 9 MG/ML
1000 INJECTION, SOLUTION INTRAVENOUS ONCE
Qty: 0 | Refills: 0 | Status: DISCONTINUED | OUTPATIENT
Start: 2018-03-04 | End: 2018-03-06

## 2018-03-04 RX ADMIN — Medication 650 MILLIGRAM(S): at 19:53

## 2018-03-04 RX ADMIN — Medication 1 TABLET(S): at 11:29

## 2018-03-04 RX ADMIN — Medication 600 MILLIGRAM(S): at 06:46

## 2018-03-04 RX ADMIN — SIMETHICONE 80 MILLIGRAM(S): 80 TABLET, CHEWABLE ORAL at 06:46

## 2018-03-04 RX ADMIN — Medication 100 MILLIGRAM(S): at 05:12

## 2018-03-04 RX ADMIN — Medication 600 MILLIGRAM(S): at 22:53

## 2018-03-04 RX ADMIN — Medication 200 MILLIGRAM(S): at 06:52

## 2018-03-04 RX ADMIN — Medication 200 MILLIGRAM(S): at 13:26

## 2018-03-04 RX ADMIN — Medication 216 GRAM(S): at 05:51

## 2018-03-04 RX ADMIN — OXYCODONE AND ACETAMINOPHEN 1 TABLET(S): 5; 325 TABLET ORAL at 10:34

## 2018-03-04 RX ADMIN — OXYCODONE AND ACETAMINOPHEN 2 TABLET(S): 5; 325 TABLET ORAL at 22:27

## 2018-03-04 RX ADMIN — Medication 100 MILLIGRAM(S): at 15:25

## 2018-03-04 RX ADMIN — Medication 216 GRAM(S): at 11:33

## 2018-03-04 RX ADMIN — OXYCODONE AND ACETAMINOPHEN 2 TABLET(S): 5; 325 TABLET ORAL at 01:09

## 2018-03-04 RX ADMIN — Medication 216 GRAM(S): at 17:08

## 2018-03-04 RX ADMIN — Medication 325 MILLIGRAM(S): at 11:29

## 2018-03-04 RX ADMIN — ENOXAPARIN SODIUM 40 MILLIGRAM(S): 100 INJECTION SUBCUTANEOUS at 21:53

## 2018-03-04 RX ADMIN — OXYCODONE AND ACETAMINOPHEN 1 TABLET(S): 5; 325 TABLET ORAL at 17:55

## 2018-03-04 NOTE — PROGRESS NOTE ADULT - SUBJECTIVE AND OBJECTIVE BOX
Postpartum Note,  Section  She is a  27y woman who is now post-operative day 1.    Subjective:  The patient feels well.  She is ambulating.   She is on clear liquids  She denies nausea and vomiting.  She is with a werner catheter.  Her pain is controlled.  She reports normal postpartum bleeding.  She is breastfeeding and bottle feeding.   She did not have flatus or bowel movements yet.    Physical exam:    Vital Signs Last 24 Hrs  T(C): 36.6 (04 Mar 2018 07:44), Max: 38.3 (03 Mar 2018 19:06)  T(F): 97.9 (04 Mar 2018 07:44), Max: 100.9 (03 Mar 2018 19:06). Last temp 100.9 @1906 33  2  HR: 95 (04 Mar 2018 07:44) (65 - 111)  BP: 103/57 (04 Mar 2018 07:44) (95/56 - 125/78)  RR: 20 (04 Mar 2018 07:44) (16 - 20)  SpO2: 98% (03 Mar 2018 16:30) (98% - 100%)    UO: 200 cc (4595-7010) 100cc/hr (min. 32cc/hr(      Gen: NAD  Abdomen: Soft, nontender, mildly distended, firm uterine fundus at umbilicus.  Incision: Clean, dry, and intact with steri strips, dressing changed  Pelvic: Normal lochia noted  Ext: No calf tenderness or edema    LABS:    preop: 16.01>10.4/32.7<246    A/P:  s/p primary C/S, POD1, chorioamnionitis on amp/gent/clinda, doing well.   - d/c werner . TOV 6 hours later.  - advance diet to full liquids  - simethicone standing  - pain management PRN  - encourage hydration and ambulation  - incentive spirometry encouraged. Postpartum Note,  Section  She is a  27y woman who is now post-operative day 1.    Subjective:  The patient feels well.  She is ambulating.   She is on clear liquids  She denies nausea and vomiting.  She is with a werner catheter.  Her pain is controlled.  She reports normal postpartum bleeding.  She is breastfeeding and bottle feeding.   She did not have flatus or bowel movements yet.    Physical exam:    Vital Signs Last 24 Hrs  T(C): 36.6 (04 Mar 2018 07:44), Max: 38.3 (03 Mar 2018 19:06)  T(F): 97.9 (04 Mar 2018 07:44), Max: 100.9 (03 Mar 2018 19:06). Last temp 100.9 @1906 3/3  2  HR: 95 (04 Mar 2018 07:44) (65 - 111)  BP: 103/57 (04 Mar 2018 07:44) (95/56 - 125/78)  RR: 20 (04 Mar 2018 07:44) (16 - 20)  SpO2: 98% (03 Mar 2018 16:30) (98% - 100%)    UO: 200 cc (2422-6939) 100cc/hr (min. 32cc/hr(      Gen: NAD  Abdomen: Soft, nontender, mildly distended, firm uterine fundus at umbilicus.  Incision: Clean, dry, and intact with steri strips, dressing changed  Pelvic: Normal lochia noted  Ext: No calf tenderness or edema    LABS:    preop: 16.01>10.4/32.7<246    A/P:  s/p primary C/S, POD1, chorioamnionitis on amp/gent/clinda, doing well.   - f/u AM labs  - c/w Abx until 24 hours afebrile  - d/c werner . TOV 6 hours later.  - advance diet to full liquids  - simethicone standing  - pain management PRN  - encourage hydration and ambulation  - incentive spirometry encouraged.

## 2018-03-05 RX ORDER — SIMETHICONE 80 MG/1
80 TABLET, CHEWABLE ORAL
Qty: 0 | Refills: 0 | Status: DISCONTINUED | OUTPATIENT
Start: 2018-03-05 | End: 2018-03-06

## 2018-03-05 RX ADMIN — OXYCODONE AND ACETAMINOPHEN 1 TABLET(S): 5; 325 TABLET ORAL at 08:10

## 2018-03-05 RX ADMIN — ENOXAPARIN SODIUM 40 MILLIGRAM(S): 100 INJECTION SUBCUTANEOUS at 21:17

## 2018-03-05 RX ADMIN — Medication 600 MILLIGRAM(S): at 11:35

## 2018-03-05 RX ADMIN — OXYCODONE AND ACETAMINOPHEN 1 TABLET(S): 5; 325 TABLET ORAL at 21:12

## 2018-03-05 RX ADMIN — Medication 325 MILLIGRAM(S): at 11:55

## 2018-03-05 RX ADMIN — OXYCODONE AND ACETAMINOPHEN 1 TABLET(S): 5; 325 TABLET ORAL at 04:21

## 2018-03-05 RX ADMIN — OXYCODONE AND ACETAMINOPHEN 1 TABLET(S): 5; 325 TABLET ORAL at 08:45

## 2018-03-05 RX ADMIN — SIMETHICONE 80 MILLIGRAM(S): 80 TABLET, CHEWABLE ORAL at 13:03

## 2018-03-05 RX ADMIN — Medication 100 MILLIGRAM(S): at 19:02

## 2018-03-05 RX ADMIN — Medication 1 TABLET(S): at 11:55

## 2018-03-05 RX ADMIN — Medication 600 MILLIGRAM(S): at 12:57

## 2018-03-05 NOTE — PROGRESS NOTE ADULT - SUBJECTIVE AND OBJECTIVE BOX
PGY1 Note:    Pt doing well, pain well controlled. No overnight events, no acute complaints. Denies fevers or chills.    Ambulating: Yes  Voiding: Yes  Flatus: Yes  Bowel movements: Yes   Breast or bottle feeding: Both  Diet: Regular    PAST MEDICAL & SURGICAL HISTORY:  No pertinent past medical history  S/P surgical removal of pilonidal cyst  H/O dilation and curettage      Physical Exam  Vital Signs Last 24 Hrs  T(F): 98.5 (05 Mar 2018 04:18), Max: 98.5 (05 Mar 2018 04:18)  HR: 105 (05 Mar 2018 04:18) (86 - 105)  BP: 120/75 (05 Mar 2018 04:18) (103/57 - 132/78)  RR: 19 (05 Mar 2018 04:18) (19 - 20)    Gen: AAOx3, NAD  Abd: Soft, appropriately tender, mildly distended, BS+  Incision: Clean, dry, intact steris in place.  Fundus: Firm, appropriately tender, below the umbilicus  Lochia: Normal  Ext: No calf tenderness, no swelling    Labs:                        7.1    19.40 )-----------( 178      ( 04 Mar 2018 08:37 )             21.1                         10.4   16.01 )-----------( 246      ( 02 Mar 2018 07:20 )             32.7 PGY1 Note:    Pt doing well, pain well controlled. No overnight events. Denies fevers or chills. Complains of pedal swelling improved with elevation of the feet.    Ambulating: Yes  Voiding: Yes  Flatus: Yes  Bowel movements: Yes   Breast or bottle feeding: Both  Diet: Regular    PAST MEDICAL & SURGICAL HISTORY:  No pertinent past medical history  S/P surgical removal of pilonidal cyst  H/O dilation and curettage      Physical Exam  Vital Signs Last 24 Hrs  T(F): 98.5 (05 Mar 2018 04:18), Max: 98.5 (05 Mar 2018 04:18)  HR: 105 (05 Mar 2018 04:18) (86 - 105)  BP: 120/75 (05 Mar 2018 04:18) (103/57 - 132/78)  RR: 19 (05 Mar 2018 04:18) (19 - 20)    Gen: AAOx3, NAD  Abd: Soft, appropriately tender, mildly distended, BS+  Incision: Clean, dry, intact steris in place.  Fundus: Firm, appropriately tender, below the umbilicus  Lochia: Normal  Ext: No calf tenderness, mild pedal edema to the ankle.    Labs:                        7.1    19.40 )-----------( 178      ( 04 Mar 2018 08:37 )             21.1                         10.4   16.01 )-----------( 246      ( 02 Mar 2018 07:20 )             32.7

## 2018-03-05 NOTE — PROGRESS NOTE ADULT - ASSESSMENT
A/P:  26yo Female s/p C/S POD2, s/p amp/gent/clinda for chorio, no issues, doing well.  -Encourage PO hydration and ambulation  -Pain management PRN  -For home tomorrow

## 2018-03-06 ENCOUNTER — TRANSCRIPTION ENCOUNTER (OUTPATIENT)
Age: 28
End: 2018-03-06

## 2018-03-06 VITALS
SYSTOLIC BLOOD PRESSURE: 126 MMHG | TEMPERATURE: 97 F | DIASTOLIC BLOOD PRESSURE: 78 MMHG | RESPIRATION RATE: 18 BRPM | HEART RATE: 71 BPM

## 2018-03-06 RX ORDER — IBUPROFEN 200 MG
1 TABLET ORAL
Qty: 0 | Refills: 0 | DISCHARGE
Start: 2018-03-06

## 2018-03-06 RX ORDER — CHOLECALCIFEROL (VITAMIN D3) 125 MCG
1 CAPSULE ORAL
Qty: 0 | Refills: 0 | COMMUNITY

## 2018-03-06 RX ADMIN — Medication 325 MILLIGRAM(S): at 12:31

## 2018-03-06 RX ADMIN — Medication 600 MILLIGRAM(S): at 01:00

## 2018-03-06 RX ADMIN — Medication 600 MILLIGRAM(S): at 09:16

## 2018-03-06 RX ADMIN — Medication 600 MILLIGRAM(S): at 10:20

## 2018-03-06 RX ADMIN — Medication 1 TABLET(S): at 12:31

## 2018-03-06 NOTE — PROGRESS NOTE ADULT - ATTENDING COMMENTS
As above, pt seen at bedside, agree with findings, impression and plan    Post Op CBC  19.4 < 7.1/21.1 > 178    IMP: s/p LTCS - POD 1 - Doing Well         Acute Blood Loss Anemia    Plan:  As ordered, begin iron once tolerating diet
IMP:  IUP @ 39.4, Arrest of Dilation/Failed Induction, Chorio    Plan:  Delivery by C/S - Consent obtained - R/B/A/P d/w pt, On call to OR
as above, pt seen at bedside, agree with findings, impression and plan    IMP: s/p LTCS - POD3 - Doing Well         Anemia    Plan:  d/c home, NSAIDs/FeSO4, f/u office - 1 week wound check
as above, p[t seen at bedside, agree with findings, impression and plan    IMP:  s/p LTCS - POD # 2 - Doing Well           Chorio - resolved           Acute Blood Loss Anemia    Plan:  Regular diet, FeSO4, PNV, Ambulation, anticipated d/c - 3/6

## 2018-03-06 NOTE — DISCHARGE NOTE OB - HOSPITAL COURSE
26 y/o, s/p primary  section for arrest of dilation, developed chorioamnionitis during labor and was treated with triple antibiotics. Uncomplicated postoperative course.

## 2018-03-06 NOTE — DISCHARGE NOTE OB - ADDITIONAL INSTRUCTIONS
If you have a temperature above 100.4F, excessive vaginal bleeding or severe abdominal pain please call your doctor or come to the emergency department.     Please follow up with Dr. Díaz in 1 week for an incision check and in 6 weeks for postpartum visit.

## 2018-03-06 NOTE — DISCHARGE NOTE OB - PATIENT PORTAL LINK FT
You can access the [facility] Patient Portal, offered by [facility], by registering with the following website: [email or web address]

## 2018-03-06 NOTE — DISCHARGE NOTE OB - PLAN OF CARE
healthy mother and baby nothing in the vagina for 6 weeks (no tampons, no intercourse, no douching, no bath tubs, no swimming pools). may shower.

## 2018-03-06 NOTE — PROGRESS NOTE ADULT - SUBJECTIVE AND OBJECTIVE BOX
PGY1 Note:    Pt doing well, pain well controlled. No overnight events, no acute complaints.    Ambulating: Yes  Voiding: Yes  Flatus: Yes  Bowel movements: Yes   Breast or bottle feeding: Both  Diet: Regular    PAST MEDICAL & SURGICAL HISTORY:  No pertinent past medical history  S/P surgical removal of pilonidal cyst  H/O dilation and curettage      Physical Exam  Vital Signs Last 24 Hrs  T(F): 97.5 (06 Mar 2018 00:10), Max: 99.7 (05 Mar 2018 07:28)  HR: 90 (06 Mar 2018 00:10) (88 - 106)  BP: 111/58 (06 Mar 2018 00:10) (97/53 - 126/66)  RR: 19 (06 Mar 2018 00:10) (19 - 20)    Gen: AAOx3, NAD  Abd: Soft, appropriately tender, mildly distended, BS+  Incision: Clean, dry, intact steris in place.  Fundus: Firm, appropriately tender, below the umbilicus  Lochia: Normal  Ext: No calf tenderness, no swelling    Labs:                        7.1    19.40 )-----------( 178      ( 04 Mar 2018 08:37 )             21.1                         10.4   16.01 )-----------( 246      ( 02 Mar 2018 07:20 )             32.7

## 2018-03-06 NOTE — PROGRESS NOTE ADULT - ASSESSMENT
A/P:  28yo Female s/p C/S POD3, s/p triples for chorio, has been afebrile PP, doing well, no issues.  -Encourage ambulation and PO hydration  -Pain management PRN  -For home today pending evaluation by PMD

## 2018-03-06 NOTE — DISCHARGE NOTE OB - CARE PROVIDER_API CALL
Mat Díaz), Obstetrics and Gynecology  [address] [place] [address]  Phone: [phone]  Fax: [phone]

## 2018-03-08 LAB — SURGICAL PATHOLOGY STUDY: SIGNIFICANT CHANGE UP

## 2018-03-12 DIAGNOSIS — Z33.1 PREGNANT STATE, INCIDENTAL: ICD-10-CM

## 2018-03-12 DIAGNOSIS — O41.1230 CHORIOAMNIONITIS, THIRD TRIMESTER, NOT APPLICABLE OR UNSPECIFIED: ICD-10-CM

## 2018-03-12 DIAGNOSIS — Z3A.39 39 WEEKS GESTATION OF PREGNANCY: ICD-10-CM

## 2018-03-12 DIAGNOSIS — D62 ACUTE POSTHEMORRHAGIC ANEMIA: ICD-10-CM

## 2018-12-28 ENCOUNTER — EMERGENCY (EMERGENCY)
Facility: HOSPITAL | Age: 28
LOS: 0 days | Discharge: HOME | End: 2018-12-28
Attending: EMERGENCY MEDICINE | Admitting: EMERGENCY MEDICINE

## 2018-12-28 VITALS
TEMPERATURE: 99 F | SYSTOLIC BLOOD PRESSURE: 118 MMHG | HEART RATE: 100 BPM | DIASTOLIC BLOOD PRESSURE: 74 MMHG | OXYGEN SATURATION: 98 % | RESPIRATION RATE: 16 BRPM

## 2018-12-28 VITALS
SYSTOLIC BLOOD PRESSURE: 115 MMHG | HEART RATE: 92 BPM | TEMPERATURE: 99 F | DIASTOLIC BLOOD PRESSURE: 70 MMHG | RESPIRATION RATE: 18 BRPM | OXYGEN SATURATION: 100 %

## 2018-12-28 DIAGNOSIS — Z98.890 OTHER SPECIFIED POSTPROCEDURAL STATES: Chronic | ICD-10-CM

## 2018-12-28 DIAGNOSIS — L05.91 PILONIDAL CYST WITHOUT ABSCESS: ICD-10-CM

## 2018-12-28 RX ORDER — MORPHINE SULFATE 50 MG/1
4 CAPSULE, EXTENDED RELEASE ORAL ONCE
Qty: 0 | Refills: 0 | Status: DISCONTINUED | OUTPATIENT
Start: 2018-12-28 | End: 2018-12-28

## 2018-12-28 RX ORDER — OXYCODONE AND ACETAMINOPHEN 5; 325 MG/1; MG/1
1 TABLET ORAL ONCE
Qty: 0 | Refills: 0 | Status: DISCONTINUED | OUTPATIENT
Start: 2018-12-28 | End: 2018-12-28

## 2018-12-28 RX ADMIN — OXYCODONE AND ACETAMINOPHEN 1 TABLET(S): 5; 325 TABLET ORAL at 14:00

## 2018-12-28 RX ADMIN — MORPHINE SULFATE 4 MILLIGRAM(S): 50 CAPSULE, EXTENDED RELEASE ORAL at 14:39

## 2018-12-28 RX ADMIN — OXYCODONE AND ACETAMINOPHEN 1 TABLET(S): 5; 325 TABLET ORAL at 12:21

## 2018-12-28 NOTE — ED PROVIDER NOTE - CARE PROVIDER_API CALL
Gretta Roe), Surgery  [address] [place] [address]  Phone: [phone]  Fax: [phone]

## 2018-12-28 NOTE — ED PROVIDER NOTE - OBJECTIVE STATEMENT
27 yo female, pmh of pilonidal cyst psh of pilonidal cyst removal, reports to the ED for evaluation of pilonidal cyst. Patient was sent in from Dr. Beckett for surgical I &D. Pt reports flair occurred three days ago, is on abx. Denies fever, chills, cp, ha, numbness, rash, sob, drainage, nvd, dysuria.

## 2018-12-28 NOTE — CONSULT NOTE ADULT - SUBJECTIVE AND OBJECTIVE BOX
RUSSELL WELCH [id number]  28y Female with PSH of pilonidal cystectomy presents with pilonidal abscess evaluation. Patient has had pain for several days and was seen by Surgeon last night who was not able to do drainage due to significant pain      PAST MEDICAL & SURGICAL HISTORY:  No pertinent past medical history  S/P surgical removal of pilonidal cyst  H/O dilation and curettage        MEDICATIONS  (STANDING): None    MEDICATIONS  (PRN):      Allergies    No Known Allergies    Intolerances        REVIEW OF SYSTEMS    [X] A ten-point review of systems was otherwise negative except as noted.  [ ] Due to altered mental status/intubation, subjective information were not able to be obtained from the patient. History was obtained, to the extent possible, from review of the chart and collateral sources of information.      Vital Signs Last 24 Hrs  T(C): 37.1 (28 Dec 2018 15:13), Max: 37.4 (28 Dec 2018 11:31)  T(F): 98.8 (28 Dec 2018 15:13), Max: 99.4 (28 Dec 2018 11:31)  HR: 92 (28 Dec 2018 15:13) (92 - 100)  BP: 115/70 (28 Dec 2018 15:13) (115/70 - 118/74)  BP(mean): --  RR: 18 (28 Dec 2018 15:13) (16 - 18)  SpO2: 100% (28 Dec 2018 15:13) (98% - 100%)    PHYSICAL EXAM:  GENERAL: NAD, well-appearing  CHEST/LUNG: Clear to auscultation bilaterally  HEART: Regular rate and rhythm  ABDOMEN: Soft, Nontender, Nondistended;   EXTREMITIES:  No clubbing, cyanosis, or edema  Rectal: pilonidal abscess appreciated fluctuant, 2cm in diameter      LABS:  Labs: None        RADIOLOGY & ADDITIONAL STUDIES:  None

## 2018-12-28 NOTE — ED PROVIDER NOTE - NS ED ROS FT
Constitutional: (-) fever, (-) chills,   Cardiovascular: (-) chest pain,   Respiratory: (-) cough, (-) shortness of breath, (-) dyspnea,   Gastrointestinal: (-) vomiting, (-) diarrhea, (-)nausea  Integumentary: (-) rash, (+) abscess  Neurological:(-) tingling, (-)numbness,   : (-)dysuria,

## 2018-12-28 NOTE — ED PROVIDER NOTE - MEDICAL DECISION MAKING DETAILS
29yo woman h/o pilonidal cyst s/p resection c/o recurrence with infection over the last 3 days, on PO abx but relief. Saw Dr Roe and was sent to the ED for surgical consult and I&D. VS, exam as noted, pt uncomfortable but nontoxic appearing. Seen by surgery and I&D at bedside. Pain control, f/u Dr Roe.

## 2018-12-28 NOTE — PROCEDURE NOTE - NSPROCNAME_GEN_A_CORE
SW contacted Pt's mother (Yannick) STAR Moon LCSW. Reviewed STAR notes.     Pt lives in Temple University Incision & Drainage

## 2018-12-28 NOTE — ED PROVIDER NOTE - PROGRESS NOTE DETAILS
Surgical resident aware of patient in AC3. Will come see patient. I have personally seen and evaluated patient, agree with PA mary jo Robert's plan of care, will consult surgical resident for further evaluation of pilonidal cyst sent in by Dr Roe. Surgical resident now in ER for patient evaluation will I&D julio cesar

## 2018-12-28 NOTE — ED ADULT NURSE NOTE - NSIMPLEMENTINTERV_GEN_ALL_ED
Implemented All Universal Safety Interventions:  [place] to call system. Call bell, personal items and telephone within reach. Instruct patient to call for assistance. Room bathroom lighting operational. Non-slip footwear when patient is off stretcher. Physically safe environment: no spills, clutter or unnecessary equipment. Stretcher in lowest position, wheels locked, appropriate side rails in place.

## 2018-12-28 NOTE — CONSULT NOTE ADULT - ASSESSMENT
28F with pilonidal abscess    Plan:  Morphine prior to I&D for pain control  I&D with packing  vns for dressing changes  f/u with Dr. Roe in clinic next week

## 2018-12-28 NOTE — PROCEDURE NOTE - PROCEDURE
<<-----Click on this checkbox to enter Procedure Pilonidal cyst incision and drainage  12/28/2018    Active  [email or web address]

## 2019-01-01 LAB
CULTURE RESULTS: SIGNIFICANT CHANGE UP
SPECIMEN SOURCE: SIGNIFICANT CHANGE UP

## 2019-06-06 NOTE — ED PROVIDER NOTE - PHYSICAL EXAMINATION
Physical Exam    Vital Signs: I have reviewed the initial vital signs.  Constitutional: well-nourished, appears stated age, no acute distress  Cardiovascular: S1 and S2, regular rate, regular rhythm, well-perfused extremities, radial pulses equal and 2+  Respiratory: unlabored respiratory effort, clear to auscultation bilaterally no wheezing, rales and rhonchi  Musculoskeletal: supple neck, no lower extremity edema, no midline tenderness  Integumentary: pilonidal cyst on gluteal cleft fluctuant.   Neurologic: awake, alert, NVI Bilateral Helical Rim Advancement Flap Text: The defect edges were debeveled with a #15 blade scalpel.  Given the location of the defect and the proximity to free margins (helical rim) a bilateral helical rim advancement flap was deemed most appropriate.  Using a sterile surgical marker, the appropriate advancement flaps were drawn incorporating the defect and placing the expected incisions between the helical rim and antihelix where possible.  The area thus outlined was incised through and through with a #15 scalpel blade.  With a skin hook and iris scissors, the flaps were gently and sharply undermined and freed up.

## 2019-07-18 ENCOUNTER — OUTPATIENT (OUTPATIENT)
Dept: OUTPATIENT SERVICES | Facility: HOSPITAL | Age: 29
LOS: 1 days | Discharge: HOME | End: 2019-07-18

## 2019-07-18 VITALS
SYSTOLIC BLOOD PRESSURE: 102 MMHG | HEIGHT: 63 IN | WEIGHT: 145.06 LBS | HEART RATE: 70 BPM | TEMPERATURE: 97 F | RESPIRATION RATE: 16 BRPM | DIASTOLIC BLOOD PRESSURE: 70 MMHG | OXYGEN SATURATION: 100 %

## 2019-07-18 DIAGNOSIS — Z98.890 OTHER SPECIFIED POSTPROCEDURAL STATES: Chronic | ICD-10-CM

## 2019-07-18 DIAGNOSIS — K43.2 INCISIONAL HERNIA WITHOUT OBSTRUCTION OR GANGRENE: ICD-10-CM

## 2019-07-18 DIAGNOSIS — Z98.891 HISTORY OF UTERINE SCAR FROM PREVIOUS SURGERY: Chronic | ICD-10-CM

## 2019-07-18 DIAGNOSIS — Z01.818 ENCOUNTER FOR OTHER PREPROCEDURAL EXAMINATION: ICD-10-CM

## 2019-07-18 LAB
ALBUMIN SERPL ELPH-MCNC: 4.6 G/DL — SIGNIFICANT CHANGE UP (ref 3.5–5.2)
ALP SERPL-CCNC: 49 U/L — SIGNIFICANT CHANGE UP (ref 30–115)
ALT FLD-CCNC: 12 U/L — SIGNIFICANT CHANGE UP (ref 0–41)
ANION GAP SERPL CALC-SCNC: 14 MMOL/L — SIGNIFICANT CHANGE UP (ref 7–14)
AST SERPL-CCNC: 16 U/L — SIGNIFICANT CHANGE UP (ref 0–41)
BASOPHILS # BLD AUTO: 0.02 K/UL — SIGNIFICANT CHANGE UP (ref 0–0.2)
BASOPHILS NFR BLD AUTO: 0.2 % — SIGNIFICANT CHANGE UP (ref 0–1)
BILIRUB SERPL-MCNC: 0.9 MG/DL — SIGNIFICANT CHANGE UP (ref 0.2–1.2)
BUN SERPL-MCNC: 7 MG/DL — LOW (ref 10–20)
CALCIUM SERPL-MCNC: 9.7 MG/DL — SIGNIFICANT CHANGE UP (ref 8.5–10.1)
CHLORIDE SERPL-SCNC: 100 MMOL/L — SIGNIFICANT CHANGE UP (ref 98–110)
CO2 SERPL-SCNC: 24 MMOL/L — SIGNIFICANT CHANGE UP (ref 17–32)
CREAT SERPL-MCNC: 0.9 MG/DL — SIGNIFICANT CHANGE UP (ref 0.7–1.5)
EOSINOPHIL # BLD AUTO: 0.06 K/UL — SIGNIFICANT CHANGE UP (ref 0–0.7)
EOSINOPHIL NFR BLD AUTO: 0.6 % — SIGNIFICANT CHANGE UP (ref 0–8)
GLUCOSE SERPL-MCNC: 87 MG/DL — SIGNIFICANT CHANGE UP (ref 70–99)
HCT VFR BLD CALC: 38 % — SIGNIFICANT CHANGE UP (ref 37–47)
HGB BLD-MCNC: 12.5 G/DL — SIGNIFICANT CHANGE UP (ref 12–16)
IMM GRANULOCYTES NFR BLD AUTO: 0.3 % — SIGNIFICANT CHANGE UP (ref 0.1–0.3)
LYMPHOCYTES # BLD AUTO: 1.83 K/UL — SIGNIFICANT CHANGE UP (ref 1.2–3.4)
LYMPHOCYTES # BLD AUTO: 19 % — LOW (ref 20.5–51.1)
MCHC RBC-ENTMCNC: 30.8 PG — SIGNIFICANT CHANGE UP (ref 27–31)
MCHC RBC-ENTMCNC: 32.9 G/DL — SIGNIFICANT CHANGE UP (ref 32–37)
MCV RBC AUTO: 93.6 FL — SIGNIFICANT CHANGE UP (ref 81–99)
MONOCYTES # BLD AUTO: 0.97 K/UL — HIGH (ref 0.1–0.6)
MONOCYTES NFR BLD AUTO: 10.1 % — HIGH (ref 1.7–9.3)
NEUTROPHILS # BLD AUTO: 6.7 K/UL — HIGH (ref 1.4–6.5)
NEUTROPHILS NFR BLD AUTO: 69.8 % — SIGNIFICANT CHANGE UP (ref 42.2–75.2)
NRBC # BLD: 0 /100 WBCS — SIGNIFICANT CHANGE UP (ref 0–0)
PLATELET # BLD AUTO: 295 K/UL — SIGNIFICANT CHANGE UP (ref 130–400)
POTASSIUM SERPL-MCNC: 4 MMOL/L — SIGNIFICANT CHANGE UP (ref 3.5–5)
POTASSIUM SERPL-SCNC: 4 MMOL/L — SIGNIFICANT CHANGE UP (ref 3.5–5)
PROT SERPL-MCNC: 8.1 G/DL — HIGH (ref 6–8)
RBC # BLD: 4.06 M/UL — LOW (ref 4.2–5.4)
RBC # FLD: 11.9 % — SIGNIFICANT CHANGE UP (ref 11.5–14.5)
SODIUM SERPL-SCNC: 138 MMOL/L — SIGNIFICANT CHANGE UP (ref 135–146)
WBC # BLD: 9.61 K/UL — SIGNIFICANT CHANGE UP (ref 4.8–10.8)
WBC # FLD AUTO: 9.61 K/UL — SIGNIFICANT CHANGE UP (ref 4.8–10.8)

## 2019-07-18 NOTE — H&P PST ADULT - NSICDXFAMILYHX_GEN_ALL_CORE_FT
FAMILY HISTORY:  Mother  Still living? Unknown  Family history of hypertension, Age at diagnosis: Age Unknown

## 2019-07-18 NOTE — H&P PST ADULT - HISTORY OF PRESENT ILLNESS
29yr old female states has had pilonidal cyst since 2007 s/p excision 2007 and I &D 2018? presents for excision of recurrent pilonidal cyst. C/o pain at the sacral area and is unable to sit and lay down on her back and is taking Amoxicillin currently. Denies c/o CP, PALP, SOB, URI, FEVER, RASH OR UTI SYMPTOMS. Exercise henrry 7-8 FOS.

## 2019-07-18 NOTE — H&P PST ADULT - NSICDXPASTSURGICALHX_GEN_ALL_CORE_FT
PAST SURGICAL HISTORY:  H/O  section     H/O dilation and curettage     S/P surgical removal of pilonidal cyst

## 2019-07-22 ENCOUNTER — RESULT REVIEW (OUTPATIENT)
Age: 29
End: 2019-07-22

## 2019-07-22 ENCOUNTER — OUTPATIENT (OUTPATIENT)
Dept: OUTPATIENT SERVICES | Facility: HOSPITAL | Age: 29
LOS: 1 days | Discharge: HOME | End: 2019-07-22
Payer: COMMERCIAL

## 2019-07-22 VITALS
SYSTOLIC BLOOD PRESSURE: 108 MMHG | OXYGEN SATURATION: 100 % | HEIGHT: 63 IN | DIASTOLIC BLOOD PRESSURE: 67 MMHG | WEIGHT: 145.06 LBS | TEMPERATURE: 98 F | HEART RATE: 69 BPM | RESPIRATION RATE: 18 BRPM

## 2019-07-22 VITALS — HEART RATE: 65 BPM | RESPIRATION RATE: 18 BRPM | DIASTOLIC BLOOD PRESSURE: 54 MMHG | SYSTOLIC BLOOD PRESSURE: 113 MMHG

## 2019-07-22 DIAGNOSIS — Z98.891 HISTORY OF UTERINE SCAR FROM PREVIOUS SURGERY: Chronic | ICD-10-CM

## 2019-07-22 DIAGNOSIS — Z98.890 OTHER SPECIFIED POSTPROCEDURAL STATES: Chronic | ICD-10-CM

## 2019-07-22 PROBLEM — D64.9 ANEMIA, UNSPECIFIED: Chronic | Status: ACTIVE | Noted: 2019-07-18

## 2019-07-22 PROCEDURE — 88304 TISSUE EXAM BY PATHOLOGIST: CPT | Mod: 26

## 2019-07-22 RX ORDER — CIPROFLOXACIN LACTATE 400MG/40ML
1 VIAL (ML) INTRAVENOUS
Qty: 10 | Refills: 0
Start: 2019-07-22 | End: 2019-07-26

## 2019-07-22 RX ORDER — HYDROMORPHONE HYDROCHLORIDE 2 MG/ML
0.5 INJECTION INTRAMUSCULAR; INTRAVENOUS; SUBCUTANEOUS
Refills: 0 | Status: DISCONTINUED | OUTPATIENT
Start: 2019-07-22 | End: 2019-07-22

## 2019-07-22 RX ORDER — SODIUM CHLORIDE 9 MG/ML
1000 INJECTION, SOLUTION INTRAVENOUS
Refills: 0 | Status: DISCONTINUED | OUTPATIENT
Start: 2019-07-22 | End: 2019-08-09

## 2019-07-22 RX ORDER — DOCUSATE SODIUM 100 MG
1 CAPSULE ORAL
Qty: 16 | Refills: 0
Start: 2019-07-22

## 2019-07-22 RX ORDER — ONDANSETRON 8 MG/1
4 TABLET, FILM COATED ORAL ONCE
Refills: 0 | Status: DISCONTINUED | OUTPATIENT
Start: 2019-07-22 | End: 2019-08-09

## 2019-07-22 RX ORDER — ACETAMINOPHEN 500 MG
650 TABLET ORAL ONCE
Refills: 0 | Status: DISCONTINUED | OUTPATIENT
Start: 2019-07-22 | End: 2019-08-09

## 2019-07-22 RX ORDER — OXYCODONE AND ACETAMINOPHEN 5; 325 MG/1; MG/1
1 TABLET ORAL ONCE
Refills: 0 | Status: DISCONTINUED | OUTPATIENT
Start: 2019-07-22 | End: 2019-07-22

## 2019-07-22 RX ORDER — AMOXICILLIN 250 MG/5ML
1 SUSPENSION, RECONSTITUTED, ORAL (ML) ORAL
Qty: 0 | Refills: 0 | DISCHARGE

## 2019-07-22 RX ORDER — IRON,CARBONYL 45 MG
1 TABLET ORAL
Qty: 0 | Refills: 0 | DISCHARGE

## 2019-07-22 RX ADMIN — OXYCODONE AND ACETAMINOPHEN 1 TABLET(S): 5; 325 TABLET ORAL at 16:47

## 2019-07-22 RX ADMIN — OXYCODONE AND ACETAMINOPHEN 1 TABLET(S): 5; 325 TABLET ORAL at 16:46

## 2019-07-22 RX ADMIN — SODIUM CHLORIDE 100 MILLILITER(S): 9 INJECTION, SOLUTION INTRAVENOUS at 16:15

## 2019-07-22 NOTE — ASU DISCHARGE PLAN (ADULT/PEDIATRIC) - SHOWER ONLY DURATION DAY(S)
Keep your dressing dry- you should use washcloths to clean yourself in the next few days until you have visiting nursing.

## 2019-07-22 NOTE — ASU DISCHARGE PLAN (ADULT/PEDIATRIC) - ASU DC SPECIAL INSTRUCTIONSFT
Keep your dressing dry- you should use washcloths to clean yourself in the next few days until you have visiting nursing. When the visiting nursing starts at your home, coordinate the timing of your visit with showering- shower prior to their arrival and then have the dressing changed.   You should pat dry, do not rub. No tubs or soaking the area.   You have some stitches at the top and bottom portion of your incision, which will stay in until Dr. Roe removes them.     You may eat today, but start slowly as anesthesia may make you nauseous.     No heavy lifting, and avoid any excess sitting, strain, or any trauma to the area.  You should continue to walk daily.     Call the office today or tomorrow at [phone] to make an appointment for follow up with Dr. Roe on Thursday 7/25/19.  Call the office with any questions or concerns and if they are not available, please seek medical attention as needed.       You can restart all your medications.   You are being given a prescription for a second antibiotic called ciprofloxacin. Make sure to take the entire course of antibiotics- do not stop them early. You should continue to take your amoxicillin as well. Take the amoxicillin until you run out.  Your antibiotics might be changed based on your culture results.   To control your pain, take acetaminophen (Tylenol) as directed on the bottle. You should take it on a schedule. For pain not controlled by Tylenol alone, a prescription was sent electronically to your pharmacy called PLUQocet. It is a combination of acetaminophen (Tylenol) and oxycodone. You should take this as needed for pain not controlled by Tylenol alone. You cannot drive while taking this medication. Follow the directions on the bottle and given to you by your pharmacist.  Also take a stool softener like Colace (docusate) while you are taking this pain medication as it may cause constipation.   You can switch to taking only Tylenol or other over the counter pain medications as your pain improves. Start taking them less often until you no longer need them. Make sure the total amount of acetaminophen (Tylenol) you take is less than 3500mg total a day- remember to add up the amount in the Percocet and any Tylenol you may take. Leave your dressing in place- Dr. Roe will take off the first dressing from the operating room at your follow up appointment on Thursday. Keep your dressing dry- you should use washcloths to clean yourself in the next few days until you have visiting nursing. When the visiting nursing starts at your home, coordinate the timing of your visit with showering- shower prior to their arrival and then have the dressing changed.   You should pat dry, do not rub. No tubs or soaking the area.   You have some stitches at the top and bottom portion of your incision, which will stay in until Dr. Roe removes them.     You may eat today, but start slowly as anesthesia may make you nauseous.     No heavy lifting, and avoid any excess sitting, strain, or any trauma to the area.  You should continue to walk daily.     Call the office today or tomorrow at [phone] to make an appointment for follow up with Dr. Roe on Thursday 7/25/19.  Call the office with any questions or concerns and if they are not available, please seek medical attention as needed.       You can restart all your medications.   You are being given a prescription for a second antibiotic called ciprofloxacin. Make sure to take the entire course of antibiotics- do not stop them early. You should continue to take your amoxicillin as well. Take the amoxicillin until you run out.  Your antibiotics might be changed based on your culture results.   To control your pain, take acetaminophen (Tylenol) as directed on the bottle. You should take it on a schedule. For pain not controlled by Tylenol alone, a prescription was sent electronically to your pharmacy called Percocet. It is a combination of acetaminophen (Tylenol) and oxycodone. You should take this as needed for pain not controlled by Tylenol alone. You cannot drive while taking this medication. Follow the directions on the bottle and given to you by your pharmacist.  Also take a stool softener like Colace (docusate) while you are taking this pain medication as it may cause constipation.   You can switch to taking only Tylenol or other over the counter pain medications as your pain improves. Start taking them less often until you no longer need them. Make sure the total amount of acetaminophen (Tylenol) you take is less than 3500mg total a day- remember to add up the amount in the Percocet and any Tylenol you may take.

## 2019-07-22 NOTE — ASU DISCHARGE PLAN (ADULT/PEDIATRIC) - CALL YOUR DOCTOR IF YOU HAVE ANY OF THE FOLLOWING:
Fever greater than (need to indicate Fahrenheit or Celsius)/Inability to tolerate liquids or foods/Swelling that gets worse/Bleeding that does not stop/Pain not relieved by Medications/Wound/Surgical Site with redness, or foul smelling discharge or pus

## 2019-07-22 NOTE — BRIEF OPERATIVE NOTE - OPERATION/FINDINGS
Prone positioning. No erythema but noted firmness, aspiration prior to incision: 22 cc seropurulent material, sent for culture. Injection of methylene blue into cavity. Elliptical incision including abscess cavity (to right of midline) and previous scars. Deep, some areas almost to periosteum. Bleeding controlled with electrocautery and suture ligation. Fibrillar placed, partial closure of superior and inferior ends of wound, packed with 4x4, ABD and tape.

## 2019-07-22 NOTE — BRIEF OPERATIVE NOTE - NSICDXBRIEFPROCEDURE_GEN_ALL_CORE_FT
PROCEDURES:  Excision of simple pilonidal cyst 22-Jul-2019 16:11:58 aspiration of cyst, injection of methylene blue Delisa Hamilton

## 2019-07-22 NOTE — ASU DISCHARGE PLAN (ADULT/PEDIATRIC) - CARE PROVIDER_API CALL
Gretta Roe)  Surgery  [address] [place] [address]  Phone: [phone]  Fax: [phone]  Follow Up Time:

## 2019-07-25 LAB
CULTURE RESULTS: SIGNIFICANT CHANGE UP
SPECIMEN SOURCE: SIGNIFICANT CHANGE UP

## 2019-07-29 DIAGNOSIS — L05.01 PILONIDAL CYST WITH ABSCESS: ICD-10-CM

## 2019-07-29 DIAGNOSIS — D64.1 SECONDARY SIDEROBLASTIC ANEMIA DUE TO DISEASE: ICD-10-CM

## 2019-07-29 DIAGNOSIS — L05.91 PILONIDAL CYST WITHOUT ABSCESS: ICD-10-CM

## 2019-07-29 DIAGNOSIS — F17.210 NICOTINE DEPENDENCE, CIGARETTES, UNCOMPLICATED: ICD-10-CM

## 2019-07-29 LAB — SURGICAL PATHOLOGY STUDY: SIGNIFICANT CHANGE UP

## 2019-09-10 NOTE — OB RN TRIAGE NOTE - NS_LABORDETAILS_OBGYN_ALL_OB
PAIN Epidural block    Pre-sedation assessment completed: 9/10/2019 12:53 PM    Patient reassessed immediately prior to procedure    Patient location during procedure: pain clinic  Start Time: 9/10/2019 1:15 PM  Stop Time: 9/10/2019 1:20 PM  Indication:procedure for pain  Performed By  Anesthesiologist: Aaron Triana MD  Preanesthetic Checklist  Completed: patient identified, site marked, surgical consent, pre-op evaluation, timeout performed, IV checked, risks and benefits discussed and monitors and equipment checked  Additional Notes  Post-Op Diagnosis Codes:     * DDD (degenerative disc disease), cervical (M50.30)     * Cervical spondylosis without myelopathy (M47.812)    The patient was observed in recovery with no evidence of neurological deficits or other problems. All questions were answered. The patient was discharged with appropriate instructions.  Prep:  Pt Position:prone  Sterile Tech:cap, gloves, mask and sterile barrier  Prep:chlorhexidine gluconate and isopropyl alcohol  Monitoring:blood pressure monitoring, continuous pulse oximetry and EKG  Procedure:Sedation: no     Approach:left paramedian  Guidance: fluoroscopy  Location:cervical  Interspace: C6-C7 entry with contrast spread the left side to C4-5 and C5-6.  Needle Type:Tuohy  Needle Gauge:20 G  Aspiration:negative  Medications:  Analgesia: Preservative Free Dexamethasone 10mg.  Preservative Free Saline:2mL  Isovue:1mL  Comments:Appropriate epidural spread of contrast.   Post Assessment:  Post-procedure: Dressing per nursing.  Pt Tolerance:patient tolerated the procedure well with no apparent complications  Complications:no             Back Pain

## 2022-03-09 NOTE — ASU PATIENT PROFILE, ADULT - NS PRO LACT YNNA
2022  EMPLOYEE INFORMATION: EMPLOYER INFORMATION:   NAME: Ada Dejesus [facility] [employer]   : 1969 [phone]    DATE OF INJURY/EVENT: 3/4/2022           Location: [facility]   Treating Provider: PAUL Mary  Time In:  3:03 PM Time Out:  3:49 PM      DIAGNOSIS:   1. Fall against object    2. Acute pain of right knee      STATUS: This injury is determined to be WORK RELATED.    RETURN TO WORK:Employee may return to work with restrictions.   Return Date: 3/9/2022            RESTRICTIONS: Restrictions are to be followed at work and at home.  Restrictions are in effect until next follow-up visit.  No twisting of right knee.      TREATMENT PLAN:   Medications for this injury/condition: Aleve twice daily with food. You may take Tylenol also if needed. No more than 4000mg per day.  Referral/Consult:   Physical Therapy: Start        Diagnostic Testing: None       Instructions: Apply ice or heat as needed.    NEXT RETURN VISIT:   Wednesday, 3/16/22 at 3:00 pm    Thank you for the privilege of providing medical care for this injury/condition.  If there are any questions, please call the occupational health clinic at Dept: [phone].      Electronically signed on 3/9/2022 at 3:49 PM by:   PAUL Mary   [facility]    
no

## 2022-06-09 NOTE — PACU DISCHARGE NOTE - THE ANESTHESIA ORDERS USED IN THE PACU ORDER SET WILL BE DISCONTINUED UPON TRANSFER OF THIS PATIENT
· Blood work today  · Increase lisinopril 30 mg   · Continue hydrochlorothiazide 12.5 mg daily  · Check BP at home daily and write those numbers down, bring information to next appointment  · Follow up 1 month for recheck Statement Selected None known

## 2022-09-01 NOTE — PROCEDURE NOTE - CARDIOVERSION: NUMBER OF ATTEMPTS
1 Oxybutynin Counseling:  I discussed with the patient the risks of oxybutynin including but not limited to skin rash, drowsiness, dry mouth, difficulty urinating, and blurred vision.

## 2022-09-22 ENCOUNTER — NON-APPOINTMENT (OUTPATIENT)
Age: 32
End: 2022-09-22

## 2022-09-22 ENCOUNTER — APPOINTMENT (OUTPATIENT)
Dept: ORTHOPEDIC SURGERY | Facility: CLINIC | Age: 32
End: 2022-09-22

## 2022-09-22 VITALS — HEIGHT: 63 IN | BODY MASS INDEX: 27.46 KG/M2 | WEIGHT: 155 LBS

## 2022-09-22 PROCEDURE — 99203 OFFICE O/P NEW LOW 30 MIN: CPT | Mod: ACP

## 2022-09-22 PROCEDURE — 73110 X-RAY EXAM OF WRIST: CPT | Mod: ACP,LT

## 2022-09-22 PROCEDURE — 73140 X-RAY EXAM OF FINGER(S): CPT | Mod: ACP,LT

## 2022-09-22 PROCEDURE — 99072 ADDL SUPL MATRL&STAF TM PHE: CPT | Mod: ACP

## 2022-09-22 RX ORDER — MELOXICAM 15 MG/1
15 TABLET ORAL
Qty: 30 | Refills: 0 | Status: ACTIVE | COMMUNITY
Start: 2022-09-22 | End: 1900-01-01
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
## 2022-09-22 NOTE — DISCUSSION/SUMMARY
[de-identified] :  impression:  At this time impression is injury to the left wrist possibly contusion and sprain over theFirst CMC joint.  \par Patient was advised for immobilization with a thumb spica brace.  \par Patient was advised for anti-inflammatories, prescription for Mobic was sent to the pharmacy.  \par Patient is total temporary disabled unable to return to work until further notice.  \par Patient was advised to follow up in about 4 weeks with our knee hand specialist.  \par \par \par Supervising physician Dr. Bo.\par

## 2022-09-22 NOTE — IMAGING
[de-identified] : On examination of the left hand and wrist, patient has swelling on about the thenar eminence.  \par No ecchymosis, erythema.  \par Patient has tenderness to palpation about the base of the 1st metacarpal and about the snuffbox.  \par Patient is able to range the thumb to flexion-extension with end range pain and she has some decreased range of motion.  \par Patient has significant tenderness over the 1st metacarpal and snuffbox.  \par Patient has good range of motion to the wrist to dorsiflexion and volar flexion.\par Neurovascular intact.\par \par   X-ray of the left wrist: Negative for any acute fracture  or dislocation\par \par X-ray of the left thumb: Negative for any acute fracture or dislocation.

## 2022-10-21 ENCOUNTER — APPOINTMENT (OUTPATIENT)
Dept: ORTHOPEDIC SURGERY | Facility: CLINIC | Age: 32
End: 2022-10-21

## 2022-10-21 PROCEDURE — 99213 OFFICE O/P EST LOW 20 MIN: CPT

## 2022-10-21 PROCEDURE — 99072 ADDL SUPL MATRL&STAF TM PHE: CPT

## 2022-10-22 NOTE — WORK
[Total] : total [Does not reveal pre-existing condition(s) that may affect treatment/prognosis] : does not reveal pre-existing condition(s) that may affect treatment/prognosis [Cannot return to work because ________] : cannot return to work because [unfilled] [I provided the services listed above] :  I provided the services listed above. [FreeTextEntry1] : good [FreeTextEntry3] : lg
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
## 2022-10-24 ENCOUNTER — APPOINTMENT (OUTPATIENT)
Dept: ORTHOPEDIC SURGERY | Facility: CLINIC | Age: 32
End: 2022-10-24

## 2022-10-24 PROCEDURE — 73110 X-RAY EXAM OF WRIST: CPT | Mod: LT

## 2022-10-24 PROCEDURE — 99072 ADDL SUPL MATRL&STAF TM PHE: CPT

## 2022-10-24 PROCEDURE — 99455 WORK RELATED DISABILITY EXAM: CPT
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
## 2022-10-24 NOTE — WORK
[Has the patient reached Maximum Medical Improvement? If yes, indicate date___] : No [FreeTextEntry1] :   She has only had 2 months since her injury and needs more time [Left] : left [FreeTextEntry7] :  hand and wrist 25%

## 2022-11-09 ENCOUNTER — APPOINTMENT (OUTPATIENT)
Dept: ORTHOPEDIC SURGERY | Facility: CLINIC | Age: 32
End: 2022-11-09

## 2022-11-09 ENCOUNTER — NON-APPOINTMENT (OUTPATIENT)
Age: 32
End: 2022-11-09

## 2022-11-09 PROCEDURE — 99072 ADDL SUPL MATRL&STAF TM PHE: CPT

## 2022-11-09 PROCEDURE — 99213 OFFICE O/P EST LOW 20 MIN: CPT

## 2022-11-09 NOTE — ASSESSMENT
[FreeTextEntry1] : Patient comes in for follow-up of her wrist injury at work.  She says that she uses a thumb spica brace a bit more than using the AOA brace because it gives her more support.  She has pain doing certain motions.  She has difficulty using her thumb.  She does not have other complaints today.\par \par   Left hand and wrist:  s tender palpation over CMC joint, full range of motion of fingers, decreased range of motion of wrist secondary to pain, neurovascularly intact\par \par  I believe this will take some more time to get better.    The patient is still having significant pain.  It has now been over 2 months from her injury.  I am recommending an MRI for evaluation of the basal joint.  In addition I would like for it some therapy to get started with some pain in range of motion.  She will follow up after she is either gone the MRI or has started therapy.

## 2022-11-28 ENCOUNTER — FORM ENCOUNTER (OUTPATIENT)
Age: 32
End: 2022-11-28

## 2022-12-08 ENCOUNTER — FORM ENCOUNTER (OUTPATIENT)
Age: 32
End: 2022-12-08

## 2022-12-13 ENCOUNTER — NON-APPOINTMENT (OUTPATIENT)
Age: 32
End: 2022-12-13

## 2022-12-16 ENCOUNTER — NON-APPOINTMENT (OUTPATIENT)
Age: 32
End: 2022-12-16

## 2022-12-20 ENCOUNTER — NON-APPOINTMENT (OUTPATIENT)
Age: 32
End: 2022-12-20

## 2022-12-20 ENCOUNTER — APPOINTMENT (OUTPATIENT)
Dept: ORTHOPEDIC SURGERY | Facility: CLINIC | Age: 32
End: 2022-12-20

## 2022-12-20 DIAGNOSIS — S69.92XA UNSPECIFIED INJURY OF LEFT WRIST, HAND AND FINGER(S), INITIAL ENCOUNTER: ICD-10-CM

## 2022-12-20 PROCEDURE — 99072 ADDL SUPL MATRL&STAF TM PHE: CPT

## 2022-12-20 PROCEDURE — 99213 OFFICE O/P EST LOW 20 MIN: CPT

## 2022-12-21 PROBLEM — S69.92XA INJURY, WRIST, LEFT, INITIAL ENCOUNTER: Status: ACTIVE | Noted: 2022-09-22

## 2022-12-21 NOTE — WORK
[Partial] : partial [Does not reveal pre-existing condition(s) that may affect treatment/prognosis] : does not reveal pre-existing condition(s) that may affect treatment/prognosis [Can return to work without limitations on ______] : can return to work without limitations on [unfilled] [I provided the services listed above] :  I provided the services listed above. [FreeTextEntry1] : good [FreeTextEntry3] : lg

## 2022-12-21 NOTE — ASSESSMENT
[FreeTextEntry1] : Patient comes in for follow-up of her wrist injury at work.  She says she is doing a lot better.  Her pain is significantly better.  She is weaning herself off of the thumb spica brace.  She has just got authorization for therapy but has not started as of yet.\par \par  Left hand and wrist:    The patient does not have significant pain throughout her entire wrist and thumb.  She has good range of motion.  She is neurovascular intact\par \par  the patient is doing a lot better.  She is ready to return to work.  She is also going to do therapy as she has got authorization.  She will follow up back in about 2 months.  If she has any issues she will let me know.

## 2023-01-03 ENCOUNTER — FORM ENCOUNTER (OUTPATIENT)
Age: 33
End: 2023-01-03

## 2023-02-17 ENCOUNTER — APPOINTMENT (OUTPATIENT)
Dept: ORTHOPEDIC SURGERY | Facility: CLINIC | Age: 33
End: 2023-02-17

## 2023-04-09 NOTE — DISCHARGE NOTE OB - AVOID PROLONGED STANDING
Additional Notes: Reviewed with pt mom the importance of the two week steroid rule. Recommended to try the wet wrap technique on the foot with the topical steroid and aquaphor. Advised to call pharmacy when refills of topicals are needed.
Detail Level: Simple
Render Risk Assessment In Note?: no
16-Apr-2023
Statement Selected

## 2023-04-11 ENCOUNTER — OUTPATIENT (OUTPATIENT)
Dept: OUTPATIENT SERVICES | Facility: HOSPITAL | Age: 33
LOS: 1 days | End: 2023-04-11
Payer: COMMERCIAL

## 2023-04-11 ENCOUNTER — APPOINTMENT (OUTPATIENT)
Dept: OBGYN | Facility: CLINIC | Age: 33
End: 2023-04-11
Payer: COMMERCIAL

## 2023-04-11 VITALS
DIASTOLIC BLOOD PRESSURE: 58 MMHG | BODY MASS INDEX: 28.88 KG/M2 | HEIGHT: 63 IN | HEART RATE: 85 BPM | WEIGHT: 163 LBS | SYSTOLIC BLOOD PRESSURE: 108 MMHG

## 2023-04-11 DIAGNOSIS — Z64.0 PROBLEMS RELATED TO UNWANTED PREGNANCY: ICD-10-CM

## 2023-04-11 DIAGNOSIS — Z98.890 OTHER SPECIFIED POSTPROCEDURAL STATES: Chronic | ICD-10-CM

## 2023-04-11 DIAGNOSIS — Z98.891 HISTORY OF UTERINE SCAR FROM PREVIOUS SURGERY: Chronic | ICD-10-CM

## 2023-04-11 DIAGNOSIS — Z30.430 ENCOUNTER FOR INSERTION OF INTRAUTERINE CONTRACEPTIVE DEVICE: ICD-10-CM

## 2023-04-11 PROCEDURE — 99215 OFFICE O/P EST HI 40 MIN: CPT

## 2023-04-11 PROCEDURE — 76815 OB US LIMITED FETUS(S): CPT | Mod: 26

## 2023-04-11 PROCEDURE — 99215 OFFICE O/P EST HI 40 MIN: CPT | Mod: 25

## 2023-04-11 PROCEDURE — 76815 OB US LIMITED FETUS(S): CPT

## 2023-04-11 RX ORDER — IBUPROFEN 800 MG/1
800 TABLET, FILM COATED ORAL EVERY 8 HOURS
Qty: 15 | Refills: 1 | Status: ACTIVE | COMMUNITY
Start: 2023-04-11 | End: 1900-01-01

## 2023-04-11 RX ORDER — MISOPROSTOL 200 UG/1
200 TABLET ORAL
Qty: 4 | Refills: 0 | Status: ACTIVE | COMMUNITY
Start: 2023-04-11 | End: 1900-01-01

## 2023-04-11 RX ORDER — PROMETHAZINE HYDROCHLORIDE 25 MG/1
25 TABLET ORAL
Qty: 5 | Refills: 0 | Status: ACTIVE | COMMUNITY
Start: 2023-04-11 | End: 1900-01-01

## 2023-04-25 ENCOUNTER — APPOINTMENT (OUTPATIENT)
Dept: OBGYN | Facility: CLINIC | Age: 33
End: 2023-04-25

## 2023-05-11 ENCOUNTER — NON-APPOINTMENT (OUTPATIENT)
Age: 33
End: 2023-05-11

## 2023-07-17 NOTE — PLAN
[FreeTextEntry1] : TAUS: +GS, +YS, CRL with Cardiac activity\par \par ASSESSMENT \par \par Patients is a  at 6+6 by LMP c/w US who presents for medication  for pregnancy termination. \par \par Options Counseling: The patient was counseled about her pregnancy options of parenthood, adoption and . She expressed her sure decision for pregnancy termination. \par \par The patient was counseled on medical and surgical  procedures and wishes to proceed with medical termination of pregnancy. \par \par  Risk of medical , including but not limited to, infection, retained products of conception, continuing pregnancy, hemorrhage, need for surgical  or D&C for incomplete  (~5%), potential for teratogenesis in this pregnancy if treatment unsuccessful, and death discussed. Discussed side effects, warning symptoms and pain management. Patient agreement forms were signed after discussion of risks and benefits of all management options.  \par  \par \par PLAN \par \par #Medication  protocol reviewed in detail. \par \par Mifepristone 200 mg (see MA note) was administered orally in the clinic today after counseling and review of consent forms.  They were  given prescriptions for misoprostol 800 mcg\par \par They were  given prescriptions for ibuprofen 800 mg for pain as well as promethazine for nausea.  They were advised to take a dose of promethazine with their first dose of ibuprofen (before or at the time of taking misoprostol) to potentially help with pain. \par \par Extensive bleeding and infection precautions were reviewed and written instructions were given to her.  Emergency contact information was provided. \par \par #Follow up: 1-2 weeks for in person visit, ultrasound \par \par #Labs: 19 HGB 12.5, O+\par \par #Contraception and Future Pregnancy Planning \par For contraception, they plan to follow up with primary Gyn \par \par #STI screening: Patient was offered screening for sexually transmitted infections.\par

## 2023-07-17 NOTE — PHYSICAL EXAM
[Appropriately responsive] : appropriately responsive [Alert] : alert [No Acute Distress] : no acute distress [Non-tender] : non-tender [Soft] : soft [No Lesions] : no lesions [Oriented x3] : oriented x3

## 2023-07-17 NOTE — HISTORY OF PRESENT ILLNESS
[FreeTextEntry1] : 34yo  LMP  presenting today for termination of pregnancy. \par \par Duration: LMP 23 - had US at Dr. Díaz's office confirmed 6w  \par \par Associated symptoms:  \par \par [ ] none  \par [x] nausea  \par [x] abdominal/pelvic pain \par [x] breast soreness  \par [ ] vaginal bleeding \par \par The patient has told her partner about the pregnancy and  decision and has good support.  The patient is firm in of their decision to proceed with termination. No one has pressured them into this decision. \par  \par __________________________________________________________________ \par \par MEDICATION  ELIGIBILITY SCREEN \par \par Requirements: (Any "no" answers means not eligible) \par \par  IUP with gestational age < 77 days (11 weeks):   YES\par Willing to undergo surgical  if medical  fails:  YES\par Has an adult support person available after misoprostol administration:  YES\par Has access to a phone at all times:  YES\par Able and agrees to follow up plan: YES  \par \par  Exclusions: (Any "yes" answer means may not be eligible) \par \par Known allergy to mifepristone/misoprostol:  NO\par Current anticoagulant therapy: NO\par Personal history of clotting disorder:  NO\par Current use of oral corticosteroids: NO\par Chronic adrenal failure: NO\par Personal history of porphyria:  NO\par IUD in place:  NO\par Anemia (hemoglobin = 9.5 g/dL) or symptoms/risk factors for anemia:  NO\par Heart disease with impaired function requiring medications:  NO\par Known large fibroid uterus:  NO\par Other serious medical problem: NO\par __________________________________________________________________ \par \par OBSTETRIC HISTORY: CS x1, EPL x2, iTOP x1\par Complications of prior pregnancies: Denies \par  \par GYNECOLOGIC HISTORY \par Denies cysts, denies fibroids, denies STI, see Dr. Díaz for regular Gyn care \par \par MEDICAL HISTORY:   Denies PMH, regular marijuana use \par \par SURGICAL HISTORY:  Tail bone surgery, D&C x1\par \par MEDICATIONS: Denies\par \par ALLERGIES: NKDA\par \par FAMILY HISTORY:  Denies\par \par SOCIAL HISTORY:  currently emplpyed, lives with her son, feels safe at home

## 2023-10-11 ENCOUNTER — NON-APPOINTMENT (OUTPATIENT)
Age: 33
End: 2023-10-11

## 2023-10-11 ENCOUNTER — APPOINTMENT (OUTPATIENT)
Dept: ORTHOPEDIC SURGERY | Facility: CLINIC | Age: 33
End: 2023-10-11
Payer: OTHER MISCELLANEOUS

## 2023-10-11 VITALS — HEIGHT: 63 IN | BODY MASS INDEX: 28.35 KG/M2 | WEIGHT: 160 LBS

## 2023-10-11 DIAGNOSIS — S93.402A SPRAIN OF UNSPECIFIED LIGAMENT OF LEFT ANKLE, INITIAL ENCOUNTER: ICD-10-CM

## 2023-10-11 PROCEDURE — 73610 X-RAY EXAM OF ANKLE: CPT | Mod: LT

## 2023-10-11 PROCEDURE — 99213 OFFICE O/P EST LOW 20 MIN: CPT | Mod: ACP

## 2024-07-11 ENCOUNTER — APPOINTMENT (OUTPATIENT)
Dept: ORTHOPEDIC SURGERY | Facility: CLINIC | Age: 34
End: 2024-07-11
Payer: OTHER MISCELLANEOUS

## 2024-07-11 ENCOUNTER — NON-APPOINTMENT (OUTPATIENT)
Age: 34
End: 2024-07-11

## 2024-07-11 VITALS — BODY MASS INDEX: 28.35 KG/M2 | WEIGHT: 160 LBS | HEIGHT: 63 IN

## 2024-07-11 DIAGNOSIS — S99.922A UNSPECIFIED INJURY OF LEFT FOOT, INITIAL ENCOUNTER: ICD-10-CM

## 2024-07-11 DIAGNOSIS — S92.515A NONDISPLACED FRACTURE OF PROXIMAL PHALANX OF LEFT LESSER TOE(S), INITIAL ENCOUNTER FOR CLOSED FRACTURE: ICD-10-CM

## 2024-07-11 PROCEDURE — L4361: CPT | Mod: LT

## 2024-07-11 PROCEDURE — 99203 OFFICE O/P NEW LOW 30 MIN: CPT | Mod: ACP,25

## 2024-07-11 PROCEDURE — 73630 X-RAY EXAM OF FOOT: CPT | Mod: LT

## 2024-08-05 ENCOUNTER — APPOINTMENT (OUTPATIENT)
Dept: ORTHOPEDIC SURGERY | Facility: CLINIC | Age: 34
End: 2024-08-05

## 2024-08-13 ENCOUNTER — NON-APPOINTMENT (OUTPATIENT)
Age: 34
End: 2024-08-13

## 2024-08-28 ENCOUNTER — APPOINTMENT (OUTPATIENT)
Dept: SURGERY | Facility: CLINIC | Age: 34
End: 2024-08-28
Payer: COMMERCIAL

## 2024-08-28 VITALS
SYSTOLIC BLOOD PRESSURE: 122 MMHG | TEMPERATURE: 97.1 F | HEART RATE: 79 BPM | BODY MASS INDEX: 30.3 KG/M2 | WEIGHT: 171 LBS | OXYGEN SATURATION: 98 % | HEIGHT: 63 IN | DIASTOLIC BLOOD PRESSURE: 74 MMHG

## 2024-08-28 DIAGNOSIS — E66.09 OTHER OBESITY DUE TO EXCESS CALORIES: ICD-10-CM

## 2024-08-28 DIAGNOSIS — E66.9 OBESITY, UNSPECIFIED: ICD-10-CM

## 2024-08-28 PROCEDURE — 99203 OFFICE O/P NEW LOW 30 MIN: CPT

## 2024-08-28 NOTE — HISTORY OF PRESENT ILLNESS
[de-identified] : Today's weight: 171 lbs/ height 63 inches/ BMI 30.3      highest weight: 189    lowest weight:  138. Goal weight: 140's  Weight loss medications: None Weight loss surgery:  None Regular medication: None Vitamin deficiencies:  Vitamin D.  Special diet restrictions, vegan, gluten-free, anything like that: None  How many meals would you say you eat on an average day: 1-2    Usual meal: steak with rice, vegetables, African food Snacking- during day- usually chips

## 2024-08-28 NOTE — PLAN
[FreeTextEntry1] :  1. Start Wegovy 0.25 mg weekly for 4 weeks then increase dose to 0.5 mg weekly 2. Nutrition consult with diet history 3.  Followup Dr. Basilio in 8 weeks 4. Increase protein intake to 70 gms daily 5. begin strength training

## 2024-08-28 NOTE — PHYSICAL EXAM
[Alert] : alert [Oriented to Person] : oriented to person [Oriented to Place] : oriented to place [Oriented to Time] : oriented to time [Calm] : calm [de-identified] : well appearing female in NAD

## 2024-09-03 RX ORDER — SEMAGLUTIDE 0.25 MG/.5ML
0.25 INJECTION, SOLUTION SUBCUTANEOUS
Qty: 1 | Refills: 2 | Status: ACTIVE | COMMUNITY
Start: 2024-08-28 | End: 1900-01-01

## 2024-09-27 ENCOUNTER — APPOINTMENT (OUTPATIENT)
Dept: SURGERY | Facility: CLINIC | Age: 34
End: 2024-09-27

## 2024-10-16 ENCOUNTER — APPOINTMENT (OUTPATIENT)
Dept: SURGERY | Facility: CLINIC | Age: 34
End: 2024-10-16
Payer: COMMERCIAL

## 2024-10-16 VITALS
HEIGHT: 63 IN | DIASTOLIC BLOOD PRESSURE: 78 MMHG | OXYGEN SATURATION: 98 % | HEART RATE: 76 BPM | SYSTOLIC BLOOD PRESSURE: 104 MMHG | TEMPERATURE: 97 F | BODY MASS INDEX: 29.41 KG/M2 | WEIGHT: 166 LBS

## 2024-10-16 PROCEDURE — 99213 OFFICE O/P EST LOW 20 MIN: CPT

## 2024-10-23 RX ORDER — SEMAGLUTIDE 0.5 MG/.5ML
0.5 INJECTION, SOLUTION SUBCUTANEOUS
Qty: 1 | Refills: 2 | Status: ACTIVE | COMMUNITY
Start: 2024-10-23 | End: 1900-01-01

## 2024-12-02 ENCOUNTER — APPOINTMENT (OUTPATIENT)
Dept: SURGERY | Facility: CLINIC | Age: 34
End: 2024-12-02

## 2024-12-02 PROCEDURE — 97802 MEDICAL NUTRITION INDIV IN: CPT | Mod: 95

## 2024-12-04 VITALS — WEIGHT: 156 LBS | BODY MASS INDEX: 27.64 KG/M2 | HEIGHT: 63 IN

## 2024-12-12 ENCOUNTER — APPOINTMENT (OUTPATIENT)
Dept: SURGERY | Facility: CLINIC | Age: 34
End: 2024-12-12

## 2025-01-06 ENCOUNTER — APPOINTMENT (OUTPATIENT)
Dept: SURGERY | Facility: CLINIC | Age: 35
End: 2025-01-06
Payer: COMMERCIAL

## 2025-01-06 PROCEDURE — 97803 MED NUTRITION INDIV SUBSEQ: CPT | Mod: 95

## 2025-01-08 VITALS — HEIGHT: 63 IN

## 2025-01-09 ENCOUNTER — LABORATORY RESULT (OUTPATIENT)
Age: 35
End: 2025-01-09

## 2025-01-09 ENCOUNTER — APPOINTMENT (OUTPATIENT)
Dept: OBGYN | Facility: CLINIC | Age: 35
End: 2025-01-09
Payer: COMMERCIAL

## 2025-01-09 VITALS
HEIGHT: 63 IN | TEMPERATURE: 98 F | BODY MASS INDEX: 27.46 KG/M2 | OXYGEN SATURATION: 98 % | HEART RATE: 80 BPM | WEIGHT: 155 LBS | SYSTOLIC BLOOD PRESSURE: 100 MMHG | DIASTOLIC BLOOD PRESSURE: 60 MMHG

## 2025-01-09 DIAGNOSIS — Z01.419 ENCOUNTER FOR GYNECOLOGICAL EXAMINATION (GENERAL) (ROUTINE) W/OUT ABNORMAL FINDINGS: ICD-10-CM

## 2025-01-09 DIAGNOSIS — F12.90 CANNABIS USE, UNSPECIFIED, UNCOMPLICATED: ICD-10-CM

## 2025-01-09 PROCEDURE — 99385 PREV VISIT NEW AGE 18-39: CPT

## 2025-01-09 PROCEDURE — 99459 PELVIC EXAMINATION: CPT

## 2025-01-14 LAB — HPV HIGH+LOW RISK DNA PNL CVX: DETECTED

## 2025-01-15 LAB
C TRACH RRNA SPEC QL NAA+PROBE: NOT DETECTED
N GONORRHOEA RRNA SPEC QL NAA+PROBE: NOT DETECTED
SOURCE AMPLIFICATION: NORMAL
SOURCE AMPLIFICATION: NORMAL
T VAGINALIS RRNA SPEC QL NAA+PROBE: NOT DETECTED

## 2025-01-16 LAB — CYTOLOGY CVX/VAG DOC THIN PREP: NORMAL

## 2025-01-22 ENCOUNTER — APPOINTMENT (OUTPATIENT)
Dept: SURGERY | Facility: CLINIC | Age: 35
End: 2025-01-22
Payer: COMMERCIAL

## 2025-01-22 VITALS
TEMPERATURE: 97 F | WEIGHT: 149 LBS | BODY MASS INDEX: 26.4 KG/M2 | DIASTOLIC BLOOD PRESSURE: 70 MMHG | SYSTOLIC BLOOD PRESSURE: 122 MMHG | HEIGHT: 63 IN

## 2025-01-22 PROCEDURE — 99213 OFFICE O/P EST LOW 20 MIN: CPT

## 2025-01-31 DIAGNOSIS — B96.89 ACUTE VAGINITIS: ICD-10-CM

## 2025-01-31 DIAGNOSIS — N76.0 ACUTE VAGINITIS: ICD-10-CM

## 2025-01-31 RX ORDER — METRONIDAZOLE 7.5 MG/G
0.75 GEL VAGINAL
Qty: 1 | Refills: 0 | Status: ACTIVE | COMMUNITY
Start: 2025-01-31 | End: 1900-01-01

## 2025-02-05 ENCOUNTER — APPOINTMENT (OUTPATIENT)
Dept: OBGYN | Facility: CLINIC | Age: 35
End: 2025-02-05

## 2025-02-05 RX ORDER — FLUCONAZOLE 150 MG/1
150 TABLET ORAL
Qty: 1 | Refills: 0 | Status: ACTIVE | COMMUNITY
Start: 2025-02-05 | End: 1900-01-01

## 2025-02-12 ENCOUNTER — APPOINTMENT (OUTPATIENT)
Dept: OBGYN | Facility: CLINIC | Age: 35
End: 2025-02-12

## 2025-02-18 ENCOUNTER — APPOINTMENT (OUTPATIENT)
Dept: OBGYN | Facility: CLINIC | Age: 35
End: 2025-02-18
Payer: COMMERCIAL

## 2025-02-18 VITALS
HEART RATE: 95 BPM | DIASTOLIC BLOOD PRESSURE: 76 MMHG | TEMPERATURE: 98.3 F | BODY MASS INDEX: 26.93 KG/M2 | HEIGHT: 63 IN | OXYGEN SATURATION: 99 % | WEIGHT: 152 LBS | SYSTOLIC BLOOD PRESSURE: 114 MMHG

## 2025-02-18 DIAGNOSIS — B97.7 PAPILLOMAVIRUS AS THE CAUSE OF DISEASES CLASSIFIED ELSEWHERE: ICD-10-CM

## 2025-02-18 DIAGNOSIS — R87.810 CERVICAL HIGH RISK HUMAN PAPILLOMAVIRUS (HPV) DNA TEST POSITIVE: ICD-10-CM

## 2025-02-18 LAB
BILIRUB UR QL STRIP: NEGATIVE
CLARITY UR: NORMAL
COLLECTION METHOD: NORMAL
GLUCOSE UR-MCNC: NEGATIVE
HCG UR QL: 0.2 EU/DL
HGB UR QL STRIP.AUTO: NEGATIVE
KETONES UR-MCNC: NEGATIVE
LEUKOCYTE ESTERASE UR QL STRIP: NEGATIVE
NITRITE UR QL STRIP: NEGATIVE
PH UR STRIP: 6.5
PROT UR STRIP-MCNC: NEGATIVE
SP GR UR STRIP: 1.02

## 2025-02-18 PROCEDURE — 57456 ENDOCERV CURETTAGE W/SCOPE: CPT

## 2025-02-18 PROCEDURE — 81003 URINALYSIS AUTO W/O SCOPE: CPT | Mod: QW

## 2025-02-20 LAB — CORE LAB BIOPSY: NORMAL

## 2025-03-19 ENCOUNTER — APPOINTMENT (OUTPATIENT)
Dept: SURGERY | Facility: CLINIC | Age: 35
End: 2025-03-19
Payer: COMMERCIAL

## 2025-03-19 VITALS
DIASTOLIC BLOOD PRESSURE: 74 MMHG | HEIGHT: 63 IN | WEIGHT: 155 LBS | BODY MASS INDEX: 27.46 KG/M2 | SYSTOLIC BLOOD PRESSURE: 110 MMHG

## 2025-03-19 PROCEDURE — G2211 COMPLEX E/M VISIT ADD ON: CPT | Mod: NC

## 2025-03-19 PROCEDURE — 99213 OFFICE O/P EST LOW 20 MIN: CPT

## 2025-03-19 RX ORDER — SEMAGLUTIDE 1 MG/.5ML
1 INJECTION, SOLUTION SUBCUTANEOUS
Qty: 1 | Refills: 0 | Status: ACTIVE | COMMUNITY
Start: 2025-03-19 | End: 1900-01-01

## 2025-03-24 ENCOUNTER — APPOINTMENT (OUTPATIENT)
Dept: SURGERY | Facility: CLINIC | Age: 35
End: 2025-03-24
Payer: COMMERCIAL

## 2025-03-24 PROCEDURE — 97803 MED NUTRITION INDIV SUBSEQ: CPT | Mod: 95

## 2025-03-25 VITALS — WEIGHT: 155 LBS | HEIGHT: 63 IN | BODY MASS INDEX: 27.46 KG/M2

## 2025-05-02 ENCOUNTER — RX RENEWAL (OUTPATIENT)
Age: 35
End: 2025-05-02

## 2025-05-14 ENCOUNTER — APPOINTMENT (OUTPATIENT)
Dept: SURGERY | Facility: CLINIC | Age: 35
End: 2025-05-14
Payer: COMMERCIAL

## 2025-05-14 VITALS
TEMPERATURE: 97 F | OXYGEN SATURATION: 95 % | SYSTOLIC BLOOD PRESSURE: 126 MMHG | HEIGHT: 63 IN | HEART RATE: 76 BPM | WEIGHT: 156 LBS | BODY MASS INDEX: 27.64 KG/M2 | DIASTOLIC BLOOD PRESSURE: 72 MMHG

## 2025-05-14 PROCEDURE — 99213 OFFICE O/P EST LOW 20 MIN: CPT

## 2025-05-14 PROCEDURE — G2211 COMPLEX E/M VISIT ADD ON: CPT | Mod: NC

## 2025-06-02 ENCOUNTER — APPOINTMENT (OUTPATIENT)
Dept: SURGERY | Facility: CLINIC | Age: 35
End: 2025-06-02
Payer: COMMERCIAL

## 2025-06-02 PROCEDURE — 97803 MED NUTRITION INDIV SUBSEQ: CPT | Mod: 95

## 2025-06-02 NOTE — ED ADULT NURSE NOTE - EXTENSIONS OF SELF_ADULT
Pharmacological stress test.    Goal BP <130/80.     Follow up with Dr. Chavarria as scheduled.     
None

## 2025-06-04 VITALS — BODY MASS INDEX: 27.46 KG/M2 | HEIGHT: 63 IN | WEIGHT: 155 LBS

## 2025-07-15 ENCOUNTER — APPOINTMENT (OUTPATIENT)
Dept: SURGERY | Facility: CLINIC | Age: 35
End: 2025-07-15
Payer: COMMERCIAL

## 2025-07-15 VITALS
WEIGHT: 151 LBS | BODY MASS INDEX: 26.75 KG/M2 | SYSTOLIC BLOOD PRESSURE: 100 MMHG | DIASTOLIC BLOOD PRESSURE: 66 MMHG | HEIGHT: 63 IN

## 2025-07-15 PROCEDURE — 99213 OFFICE O/P EST LOW 20 MIN: CPT

## 2025-07-28 ENCOUNTER — RX RENEWAL (OUTPATIENT)
Age: 35
End: 2025-07-28

## 2025-08-04 RX ORDER — SEMAGLUTIDE 1.7 MG/.75ML
1.7 INJECTION, SOLUTION SUBCUTANEOUS
Qty: 4 | Refills: 1 | Status: ACTIVE | COMMUNITY
Start: 2025-08-04 | End: 1900-01-01

## 2025-08-28 ENCOUNTER — APPOINTMENT (OUTPATIENT)
Dept: ORTHOPEDIC SURGERY | Facility: CLINIC | Age: 35
End: 2025-08-28
Payer: OTHER MISCELLANEOUS

## 2025-08-28 VITALS — HEIGHT: 63 IN | BODY MASS INDEX: 26.58 KG/M2 | WEIGHT: 150 LBS

## 2025-08-28 DIAGNOSIS — S89.91XA UNSPECIFIED INJURY OF RIGHT LOWER LEG, INITIAL ENCOUNTER: ICD-10-CM

## 2025-08-28 PROCEDURE — 99213 OFFICE O/P EST LOW 20 MIN: CPT

## 2025-08-28 PROCEDURE — 73562 X-RAY EXAM OF KNEE 3: CPT | Mod: RT

## 2025-08-28 RX ORDER — DICLOFENAC SODIUM 3 G/100G
3 GEL TOPICAL
Qty: 1 | Refills: 2 | Status: ACTIVE | COMMUNITY
Start: 2025-08-28 | End: 1900-01-01

## 2025-09-03 RX ORDER — SEMAGLUTIDE 1.7 MG/.75ML
1.7 INJECTION, SOLUTION SUBCUTANEOUS
Qty: 1 | Refills: 2 | Status: ACTIVE | COMMUNITY
Start: 2025-09-03 | End: 1900-01-01

## 2025-09-17 ENCOUNTER — APPOINTMENT (OUTPATIENT)
Dept: SURGERY | Facility: CLINIC | Age: 35
End: 2025-09-17

## 2025-09-26 PROBLEM — E66.811 CLASS 1 OBESITY WITH SERIOUS COMORBIDITY AND BODY MASS INDEX (BMI) OF 30.0 TO 30.9 IN ADULT, UNSPECIFIED OBESITY TYPE: Status: ACTIVE | Noted: 2024-08-28
